# Patient Record
Sex: FEMALE | Race: WHITE | Employment: OTHER | URBAN - METROPOLITAN AREA
[De-identification: names, ages, dates, MRNs, and addresses within clinical notes are randomized per-mention and may not be internally consistent; named-entity substitution may affect disease eponyms.]

---

## 2017-12-21 ENCOUNTER — HOSPITAL ENCOUNTER (OUTPATIENT)
Dept: WOUND CARE | Age: 77
Discharge: HOME | End: 2017-12-21
Attending: PODIATRIST | Admitting: PODIATRIST

## 2017-12-21 VITALS
WEIGHT: 207.23 LBS | BODY MASS INDEX: 40.69 KG/M2 | RESPIRATION RATE: 18 BRPM | DIASTOLIC BLOOD PRESSURE: 79 MMHG | TEMPERATURE: 98.8 F | HEIGHT: 60 IN | SYSTOLIC BLOOD PRESSURE: 140 MMHG | HEART RATE: 80 BPM

## 2017-12-21 DIAGNOSIS — L97.422 ULCER OF LEFT HEEL, WITH FAT LAYER EXPOSED (HCC): ICD-10-CM

## 2017-12-21 RX ORDER — SIMVASTATIN 20 MG
20 TABLET ORAL NIGHTLY
COMMUNITY
Start: 2017-12-06

## 2017-12-21 RX ORDER — CETIRIZINE HYDROCHLORIDE 10 MG/1
TABLET ORAL
COMMUNITY
Start: 2015-11-09

## 2017-12-21 RX ORDER — LIDOCAINE HYDROCHLORIDE 20 MG/ML
3 INJECTION, SOLUTION EPIDURAL; INFILTRATION; INTRACAUDAL; PERINEURAL ONCE
Status: DISCONTINUED | OUTPATIENT
Start: 2017-12-21 | End: 2017-12-22 | Stop reason: HOSPADM

## 2017-12-21 RX ORDER — FENOFIBRATE 160 MG/1
TABLET ORAL
COMMUNITY
Start: 2017-11-05

## 2017-12-21 RX ORDER — ACETAMINOPHEN 500 MG
500 TABLET ORAL EVERY 6 HOURS PRN
COMMUNITY

## 2017-12-21 RX ORDER — LEVOTHYROXINE SODIUM 0.07 MG/1
75 TABLET ORAL DAILY
COMMUNITY
Start: 2017-06-07

## 2017-12-21 RX ORDER — FERROUS SULFATE 325(65) MG
325 TABLET ORAL
COMMUNITY

## 2017-12-21 RX ORDER — FUROSEMIDE 40 MG/1
TABLET ORAL
COMMUNITY
Start: 2017-11-05

## 2017-12-21 RX ORDER — LIDOCAINE HYDROCHLORIDE 40 MG/ML
SOLUTION TOPICAL PRN
Status: DISCONTINUED | OUTPATIENT
Start: 2017-12-21 | End: 2017-12-22 | Stop reason: HOSPADM

## 2017-12-21 RX ORDER — LOSARTAN POTASSIUM 100 MG/1
TABLET ORAL
COMMUNITY
Start: 2017-12-06

## 2017-12-21 RX ORDER — INDAPAMIDE 1.25 MG/1
TABLET, FILM COATED ORAL
COMMUNITY
Start: 2017-12-06

## 2017-12-21 ASSESSMENT — PAIN DESCRIPTION - ONSET: ONSET: ON-GOING

## 2017-12-21 ASSESSMENT — PAIN DESCRIPTION - PROGRESSION: CLINICAL_PROGRESSION: NOT CHANGED

## 2017-12-21 ASSESSMENT — PAIN DESCRIPTION - DESCRIPTORS: DESCRIPTORS: ACHING

## 2017-12-21 ASSESSMENT — PAIN DESCRIPTION - PAIN TYPE: TYPE: ACUTE PAIN

## 2017-12-21 ASSESSMENT — PAIN DESCRIPTION - LOCATION: LOCATION: FOOT

## 2017-12-21 ASSESSMENT — PAIN SCALES - GENERAL: PAINLEVEL_OUTOF10: 3

## 2017-12-21 ASSESSMENT — PAIN DESCRIPTION - ORIENTATION: ORIENTATION: LEFT;RIGHT

## 2017-12-21 ASSESSMENT — PAIN DESCRIPTION - FREQUENCY: FREQUENCY: INTERMITTENT

## 2017-12-21 NOTE — PROGRESS NOTES
Y[facility]h [address]   Progress Note and Procedure Note      Jose Payne  MEDICAL RECORD NUMBER:  [id number]  AGE: 76 y.o. GENDER: female  : 1940  EPISODE DATE:  2017    Subjective:     Chief Complaint   Patient presents with    Wound Check     Initial visit wounds on left and right dorsal lateral feet left noted 3 years ago and the right started 1 year ago. Patient has been treating at home with a \"salve\" and bandaids. HISTORY of PRESENT ILLNESS HPI     Jsoe Payne is a 76 y.o. female who presents today for wound/ulcer evaluation. History of Wound Context: patient present with a left dorsal foot wound that she relates that she has had on and off for the last 3 years. It started as an area with a scab that she picked off and it has continued eversince. Wound/Ulcer Pain Timing/Severity: none  Quality of pain: N/A  Severity:  0 / 10   Modifying Factors: None  Associated Signs/Symptoms: none    Ulcer Identification:  Ulcer Type: undetermined  Contributing Factors: diabetes    Wound: N/A        PAST MEDICAL HISTORY        Diagnosis Date    Arthritis     Cancer ([facility] [address]     uterine    Diabetes mellitus ([facility] [address]     Pre DM    GI bleed     Hyperlipidemia     Thyroid disease        PAST SURGICAL HISTORY    Past Surgical History:   Procedure Laterality Date    BACK SURGERY      CATARACT REMOVAL         FAMILY HISTORY    History reviewed. No pertinent family history. SOCIAL HISTORY    Social History   Substance Use Topics    Smoking status: Never Smoker    Smokeless tobacco: Never Used    Alcohol use No       ALLERGIES    Allergies   Allergen Reactions    Sulfa Antibiotics Swelling       MEDICATIONS    No current outpatient prescriptions on file prior to encounter. No current facility-administered medications on file prior to encounter. REVIEW OF SYSTEMS    Pertinent items are noted in HPI.    Review of Systems: A 12 point review of symptoms is unremarkable with the exception of the chief complaint. Patient specifically denies nausea, fever, vomiting, chills, shortness of breath, chest pain, abdominal pain, constipation or difficulty urinating. Objective:      BP (!) 140/79   Pulse 80   Temp 98.8 °F (37.1 °C) (Oral)   Resp 18   Ht 5' (1.524 m)   Wt 207 lb 3.7 oz (94 kg)   BMI 40.47 kg/m²     Wt Readings from Last 3 Encounters:   12/21/17 207 lb 3.7 oz (94 kg)       PHYSICAL EXAM    DP/PT pulses palpable bilaterally. CFT brisk to all digits. Digits are pink and warm to the touch. Hair growth is normal in appearance. No edema noted. No calf pain with palpation noted. Epicritic sensation is grossly diminished distal to the midfoot bilaterally. Negative clonus and babinski reflex is down going. Wound noted over the dorsal aspect of the left foot. Wound has fibrotic and nonviable tissue that extends down through and includes the subcutaneous tissue. After debridement the wound has a granular base. There is no surrounding erythema, edema, warmth or malodor noted. The wound does not probe or track to bone. Assessment:      Procedure  Biopsy Note    Performed by: Damion Parham DPM    Consent obtained?  Yes    Time out taken:Yes    Pain Control: Anesthetic: 4% Lidocaine Liquid Topical (2.5mL's) 3ml of intradermal lidocaine    Location of Biopsy:  Wound/Ulcer Number(s)  Wound/Ulcer # 1    Wound 12/21/17 Foot Left;Dorsal;Lateral wound #1, approx: 3 years (Active)   Wound Image   12/21/2017  9:19 AM   Wound Type Wound 12/21/2017  9:19 AM   Wound Other 12/21/2017  9:19 AM   Wound Length (cm) 0.5 cm 12/21/2017  9:57 AM   Wound Width (cm) 0.5 cm 12/21/2017  9:57 AM   Wound Depth (cm)  0.2 12/21/2017  9:57 AM   Calculated Wound Size (cm^2) (l*w) 0.25 cm^2 12/21/2017  9:57 AM   Change in Wound Size % (l*w) -56.25 12/21/2017  9:57 AM   Wound Assessment Granulation tissue;Slough 12/21/2017  9:19 AM   Drainage Amount None 12/21/2017  9:19 AM   Odor None 12/21/2017  9:19 AM Margins Undefined edges 12/21/2017  9:19 AM   Tiny-wound Assessment Dry;Pink 12/21/2017  9:19 AM   Non-staged Wound Description Full thickness 12/21/2017  9:19 AM   Celeste%Wound Bed 10 12/21/2017  9:19 AM   Yellow%Wound Bed 90 12/21/2017  9:19 AM   Op First Treatment Date 12/21/17 12/21/2017  9:19 AM   Number of days: 0       Wound 12/21/17 Foot Right;Dorsal;Lateral wound #2; approx: 1 year (Active)   Wound Image   12/21/2017  9:19 AM   Dressing Status Old drainage 12/21/2017  9:19 AM   Wound Length (cm) 0.6 cm 12/21/2017  9:57 AM   Wound Width (cm) 0.5 cm 12/21/2017  9:57 AM   Wound Depth (cm)  0.2 12/21/2017  9:57 AM   Calculated Wound Size (cm^2) (l*w) 0.3 cm^2 12/21/2017  9:57 AM   Change in Wound Size % (l*w) -50 12/21/2017  9:57 AM   Wound Assessment Slough;Granulation tissue 12/21/2017  9:19 AM   Drainage Amount None 12/21/2017  9:19 AM   Odor None 12/21/2017  9:19 AM   Tiny-wound Assessment Pink;Dry 12/21/2017  9:19 AM   Non-staged Wound Description Full thickness 12/21/2017  9:19 AM   Celeste%Wound Bed 90 12/21/2017  9:19 AM   Yellow%Wound Bed 10 12/21/2017  9:19 AM   Op First Treatment Date 12/21/17 12/21/2017  9:19 AM   Number of days: 0        Biopsy performed with: 3 mm skin punch    Instruments:scissors and forceps     Specimen sent to Pathology:Yes    Estimated Blood Loss: with a small amount of bleeding    Hemostasis Achieved:by pressure    Procedural Pain:0  / 10     Post Procedural Pain:0 / 10     Response to treatment:Well tolerated by patient. Plan:   E/M x 30 minutes of a new problem of left dorsal foot wound. Due to the long standing nature of the wound a cutaneous punch biopsy was performed to evaluate for a possible malignancy as patient has a history of skin cancer. Punch biopsy was performed and tissue sent to dermatopathology. Treatment Note please see attached Discharge Instructionsv    Written patient dismissal instructions given to patient and signed by patient or POA. RTC 1 week    Discharge Instructions       Wound Clinic Physician Orders and Discharge Instructions  [facility]  [address]   [address] [place]  Telephone: [phone]    NAME:  Bridgett Payne  YOB: 1940  MEDICAL RECORD NUMBER:  [id number]  DATE:  12/21/2017    Wound Cleansing:   Do not scrub or use excessive force. Cleanse wound prior to applying a clean dressing with:  [] Normal Saline [] Keep Wound Dry in Shower    [] Wound Cleanser   [] Cleanse wound with Mild Soap & Water  [] May Shower at Discharge   [] Other:       Topical Treatments:  Do not apply lotions, creams, or ointments to wound bed unless directed. [] Apply moisturizing lotion to skin surrounding the wound prior to dressing change.  [] Apply antifungal ointment to skin surrounding the wound prior to dressing change.  [] Apply thin film of moisture barrier ointment to skin immediately around wound. [] Other:      Dressings:           Wound Location: Left foot wound  [x] Apply Primary Dressing:       [x] Polysporin  [] Other:    [x] Cover and Secure with:     [x] Gauze [x] Cover Roll Tape [] ABD     [] Other:    Avoid contact of tape with skin.   [x] Change dressing:    [x] Other:Leave Dressing on for 3 days; then may cover with Band- Aid until scabbed      Dressings:           Wound Location: Right foot    [x] Apply Primary Dressing:       [] MediHoney Gel [] Alginate with Silver [] Alginate   [] Collagen [] Collagen with Silver   [] Santyl with Moisten saline gauze     [] Hydrocolloid   [] MediHoney Alginate [] Foam with Silver   [] Foam   [] Hydrofera Blue    [] Mepilex Border    [] Moisten with Saline [] Hydrogel [] Mepitel     [] Bactroban/Mupirocin [x] Polysporin  [] Other:    [] Pack wound loosely with  [] Iodoform   [] Plain Packing  [] Other   [] Cover and Secure with:     [] Gauze [] Laurie [] Kerlix   [] Ace Wrap [] Cover Roll Tape [] ABD     [x] Other: wound(s) or have questions about your wound care, please contact the [facility] at [address] 8:30 am - 4:30 pm and Friday 8:30 am - 1:00 pm.  If you need help with your wound outside these hours and cannot wait until we are again available, contact your PCP or go to the hospital emergency room. Nurse Signature:_______________________    Date: ___________ Time:  ____________      Discharge Nurse Signature      PLEASE NOTE: IF YOU ARE UNABLE TO OBTAIN WOUND SUPPLIES, CONTINUE TO USE THE SUPPLIES YOU HAVE AVAILABLE UNTIL YOU ARE ABLE TO [facility]. IT IS MOST IMPORTANT TO KEEP THE WOUND COVERED AT ALL TIMES. Physician Signature:_______________________    Date: ___________ Time:  ____________       [x] Dr Lokesh Florian    [] Dr Abhijeet Reynolds   [] Malathi Comer CNP     [] Dr David Padilla  [] Dr Cameron Griffith   [] Dr Kenney Bartholomew  [] Dr Donnie Caldwell     [] Dr Yesika Ashley   [] Stanley Stubbs NP    [] Dr. Nando Gonzalez      The information contained in the After Visit Summary has been reviewed with me, the patient and/or responsible adult, by my health care provider(s). I had the opportunity to ask questions regarding this information. I have elected to receive;       Patient Signature:_______________________    Date: ___________ Time:  ____________    [] Patient unable to sign Discharge Instructions given to ECF/Transportation/POA      [x]  After Visit Summary  []  Comprehensive Discharge Instruction                Electronically signed by Lokesh Florian DPM on 12/21/2017 at 2:37 PM

## 2017-12-28 ENCOUNTER — HOSPITAL ENCOUNTER (OUTPATIENT)
Dept: WOUND CARE | Age: 77
Discharge: HOME | End: 2017-12-28
Attending: PODIATRIST | Admitting: PODIATRIST

## 2017-12-28 VITALS
TEMPERATURE: 98 F | DIASTOLIC BLOOD PRESSURE: 70 MMHG | SYSTOLIC BLOOD PRESSURE: 144 MMHG | HEART RATE: 68 BPM | RESPIRATION RATE: 18 BRPM

## 2017-12-28 DIAGNOSIS — D04.71: ICD-10-CM

## 2017-12-28 ASSESSMENT — PAIN SCALES - GENERAL: PAINLEVEL_OUTOF10: 0

## 2017-12-28 NOTE — PROGRESS NOTES
Yisel Saleh [address]   Progress Note and Procedure Note      Enedina Payne  MEDICAL RECORD NUMBER:  [id number]  AGE: 76 y.o. GENDER: female  : 1940  EPISODE DATE:  2017    Subjective:     Chief Complaint   Patient presents with    Wound Check     Follow-up visit for wounds to the right and left feet. HISTORY of PRESENT ILLNESS HPI     Enedina Payne is a 76 y.o. female who presents today for wound/ulcer evaluation. History of Wound Context: patient present with a left dorsal foot wound that she relates that she has had on and off for the last 3 years. It started as an area with a scab that she picked off and it has continued eversince. Wound/Ulcer Pain Timing/Severity: none  Quality of pain: N/A  Severity:  0 / 10   Modifying Factors: None  Associated Signs/Symptoms: none    Ulcer Identification:  Ulcer Type: undetermined  Contributing Factors: diabetes    Wound: N/A        PAST MEDICAL HISTORY        Diagnosis Date    Arthritis     Cancer ([facility] [address]     uterine    Diabetes mellitus ([facility] [address]     Pre DM    GI bleed     Hyperlipidemia     Thyroid disease        PAST SURGICAL HISTORY    Past Surgical History:   Procedure Laterality Date    BACK SURGERY      CATARACT REMOVAL         FAMILY HISTORY    History reviewed. No pertinent family history.     SOCIAL HISTORY    Social History   Substance Use Topics    Smoking status: Never Smoker    Smokeless tobacco: Never Used    Alcohol use No       ALLERGIES    Allergies   Allergen Reactions    Sulfa Antibiotics Swelling       MEDICATIONS    Current Outpatient Prescriptions on File Prior to Encounter   Medication Sig Dispense Refill    acetaminophen (TYLENOL) 500 MG tablet Take 500 mg by mouth every 6 hours as needed for Pain      Calcium Carb-Cholecalciferol 600-100 MG-UNIT CAPS Take by mouth      cetirizine (ZYRTEC) 10 MG tablet TAKE ONE TABLET BY MOUTH EVERY DAY      fenofibrate 160 MG tablet TAKE 1 TABLET EVERY MORNING      ferrous sulfate 325 (65 Fe) MG tablet Take 325 mg by mouth      furosemide (LASIX) 40 MG tablet TAKE 1 TABLET EVERY MORNING      indapamide (LOZOL) 1.25 MG tablet TAKE 1 TABLET DAILY      losartan (COZAAR) 100 MG tablet TAKE 1 TABLET TWICE A DAY      metFORMIN (GLUCOPHAGE) 500 MG tablet One pill twice a day      simvastatin (ZOCOR) 20 MG tablet TAKE 1 TABLET AT BEDTIME      levothyroxine (SYNTHROID) 75 MCG tablet Take 75 mcg by mouth       No current facility-administered medications on file prior to encounter. REVIEW OF SYSTEMS    Pertinent items are noted in HPI. Review of Systems: A 12 point review of symptoms is unremarkable with the exception of the chief complaint. Patient specifically denies nausea, fever, vomiting, chills, shortness of breath, chest pain, abdominal pain, constipation or difficulty urinating. Objective:      BP (!) 144/70   Pulse 68   Temp 98 °F (36.7 °C) (Oral)   Resp 18     Wt Readings from Last 3 Encounters:   12/21/17 207 lb 3.7 oz (94 kg)       PHYSICAL EXAM    DP/PT pulses palpable bilaterally. CFT brisk to all digits. Digits are pink and warm to the touch. Hair growth is normal in appearance. No edema noted. No calf pain with palpation noted. Epicritic sensation is grossly diminished distal to the midfoot bilaterally. Negative clonus and babinski reflex is down going. Wound noted over the dorsal aspect of the left foot. Wound has fibrotic and nonviable tissue that extends down through and includes the subcutaneous tissue. After debridement the wound has a granular base. There is no surrounding erythema, edema, warmth or malodor noted. The wound does not probe or track to bone. 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   Due to the long standing nature of the wound a cutaneous punch biopsy was performed to evaluate for a possible malignancy as patient has a history of skin cancer and was positive. Patient will be scheduled for surgical excision of this lesion and a similar lesion on the right foot under local anesthesia. A fresh frozen section will be sent to insure that clean margins are obtained prior to wound closure. All risks vs benefits of the planned procedure were discussed with the patient in detail. Punch biopsy results was reviewed with the patient    Treatment Note please see attached Discharge Instructions    Written patient dismissal instructions given to patient and signed by patient or POA. RTC for post operative visit. Discharge Instructions       Wound Clinic Physician Orders and Discharge Instructions  [facility]  [address] [place] [address]   [address] [facility] [place] [address]  Telephone: [phone]     NAME:  Dee Payne  YOB: 1940  MEDICAL RECORD NUMBER:  [id number]  DATE:  12/28/2017     Wound Cleansing:   Do not scrub or use excessive force. Cleanse wound prior to applying a clean dressing with:  [] Normal Saline            [] Keep Wound Dry in Shower    [] Wound Cleanser   [] Cleanse wound with Mild Soap & Water  [] May Shower at Discharge   [] Other:        Topical Treatments:  Do not apply lotions, creams, or ointments to wound bed unless directed. [] Apply moisturizing lotion to skin surrounding the wound prior to dressing change.  [] Apply antifungal ointment to skin surrounding the wound prior to dressing change.  [] Apply thin film of moisture barrier ointment to skin immediately around wound.   [] Other:                 Dressings:                  Wound Location: Left foot wound  [x] Apply Primary Dressing:                                              [x] Polysporin                  [] Other:    [x] Cover and Secure with:                       [x] Gauze [x] Cover Roll Tape         [] ABD After Visit Summary  []  Comprehensive Discharge Instruction           Electronically signed by Joe Martinez DPM on 12/28/2017 at 1:16 PM

## 2018-01-18 ENCOUNTER — HOSPITAL ENCOUNTER (OUTPATIENT)
Dept: SURGERY | Age: 78
Discharge: HOME | End: 2018-01-18
Attending: PODIATRIST | Admitting: PODIATRIST

## 2018-01-18 VITALS
OXYGEN SATURATION: 97 % | SYSTOLIC BLOOD PRESSURE: 146 MMHG | RESPIRATION RATE: 16 BRPM | TEMPERATURE: 97.6 F | DIASTOLIC BLOOD PRESSURE: 62 MMHG | HEART RATE: 76 BPM

## 2018-01-18 DIAGNOSIS — D04.72: ICD-10-CM

## 2018-01-18 RX ORDER — CEPHALEXIN 500 MG/1
500 CAPSULE ORAL 4 TIMES DAILY
Qty: 28 CAPSULE | Refills: 0 | Status: SHIPPED | OUTPATIENT
Start: 2018-01-18 | End: 2018-01-25

## 2018-01-18 RX ORDER — HYDROCODONE BITARTRATE AND ACETAMINOPHEN 5; 325 MG/1; MG/1
1 TABLET ORAL EVERY 6 HOURS PRN
Qty: 20 TABLET | Refills: 0 | Status: SHIPPED | OUTPATIENT
Start: 2018-01-18 | End: 2018-01-23

## 2018-01-18 ASSESSMENT — PAIN SCALES - GENERAL
PAINLEVEL_OUTOF10: 0
PAINLEVEL_OUTOF10: 0

## 2018-01-18 NOTE — PROGRESS NOTES
Alert and oriented. No c/o. Vss. Dressings and boots are clean dry intact. Toes warm, move well, renee well. Family at bedside. Understands discharge instructions. Tolerated sitting up and po fluids well.

## 2018-01-18 NOTE — BRIEF OP NOTE
Brief Postoperative Note    Melani Payne  YOB: 1940  MRN: [id number]  DOS: 1/18/2018    Surgeon: Dr. Bienvenido Borges DPM    Assistants: Za Schuster, PGY-3; Zheng Tobar PGY-1    Pre-operative Diagnosis:   1. Squamous cell carcinoma, left foot  2. Neoplasm of unknown origin, right foot    Post-operative Diagnosis:   1. Squamous cell carcinoma in situ, left and right    Procedure:   1. Excision of squamous cell carcinoma, left foot  2. Advancement closure, left foot  3. Excision of squamous cell carcinoma, right foot  4. Advancement closure, right foot    Anesthesia: Local    Hemostasis: anatomic dissection, Bovie electrocautery     Estimated Blood Loss: less than 10 cc    Materials: 3-0 nylon    Injectables: Pre: refer to op report ; Post: none    Complications: None    Specimens: Was Obtained:   1. Squamous cell carcinoma sent to pathology, left foot  2. Neoplasm on unknown origin sent to pathology, right foot    Findings: Bilateral specimens were sent for fresh frozen sections intra-operatively; lesions confirmed to be SCC in situ per pathologist; pathologist confirmed complete excision of lesions to normal anatomical margins    The patient tolerated the procedure and anesthesia well and was transported from the operating room to the PACU with vital signs stable and vascular status intact to all aspects of the patient's bilateral lower extremity and digital capillary refill time immediate to the digits of the bilateral  foot. Following a period of post-operative monitoring, the patient will be discharged home with written and oral wound care and follow-up instructions per Dr. Pretty Gomez. The patient is to follow-up with Dr. Pretty Gomez in her private office within 3-5 days. The patient is to keep dressing clean, dry and intact at all times. The patient is to call with if any complications occur.     Dictated for Dr. Bienvenido Borges DPM.    Za Schuster DPM, PGY-3  Pager: [phone]

## 2018-01-19 NOTE — OP NOTE
the bilateral feet utilizing Marcaine with epinephrine. Once local anesthesia had been achieved, the bilateral feet were scrubbed,  prepped, and draped in the usual sterile manner. A time-out was performed. At this time, attention was directed to the patient's right foot. After  the local anesthesia was tested, the lesion was excised in its entirety  with a 3-mm margin creating an approximately 1-cm soft tissue deficit. It  was tagged with a suture placed at 12 o'clock and sent to Pathology for  fresh frozen. The pathology was called back with confirmation that the  lesion, both the diagnosis and clean margins were resected. Once this  confirmation was received, the lesion was then closed utilizing an  advancement flap wound closure of less than 10 sq cm. At this time, a 3:1  ellipse was taken out of the wound and utilizing extensive soft tissue  undermining and rearranging techniques, the subcutaneous flaps were raised  both medially and laterally to bring the wound edges together. At this  time, the skin edges came together with no tension and were closed  utilizing 4-0 Vicryl and 3-0 nylon suture. Upon completion of this,  attention was directed to the right foot. Procedure Title:  Excision of neoplasm with advancement flap wound closure,  right foot. At this time, the aforementioned lesion was circumscribed and excised in  its entirety of approximately 1 cm in diameter. Upon completion of this,  it was tagged at 12 o'clock and sent to Pathology. After Pathology  confirmed both the diagnosis and the clean margins had been excised, two  semi-elliptical skin wedges approximately 3 cm in diameter were created and  the lesion was attempted to close primarily. This required advancement  flap closure with extensive soft tissue rearrangements and raising of  subcutaneous flaps both dorsally and laterally.   This allowed the skin  edges to come together with no tension utilizing 4-0 Vicryl and 3-0

## 2018-01-25 ENCOUNTER — HOSPITAL ENCOUNTER (OUTPATIENT)
Dept: WOUND CARE | Age: 78
Discharge: HOME | End: 2018-01-25
Attending: PODIATRIST | Admitting: PODIATRIST

## 2018-01-25 VITALS
DIASTOLIC BLOOD PRESSURE: 83 MMHG | TEMPERATURE: 97.5 F | RESPIRATION RATE: 16 BRPM | SYSTOLIC BLOOD PRESSURE: 147 MMHG | HEART RATE: 72 BPM

## 2018-01-29 NOTE — PROGRESS NOTES
Yisel [address]   Progress Note and Procedure Note      Rosa Payne  MEDICAL RECORD NUMBER:  [id number]  AGE: 76 y.o. GENDER: female  : 1940  EPISODE DATE:  2018    Subjective:     Chief Complaint   Patient presents with    Wound Check     follow up wounds left and right feet         HISTORY of PRESENT ILLNESS HPI     Rosa Payne is a 76 y.o. female who presents today for wound/ulcer evaluation. History of Wound Context: patient present with a left dorsal foot wound that she relates that she has had on and off for the last 3 years. It started as an area with a scab that she picked off and it has continued eversince. Patient presents today for first post-op visit. She relates that she thought that the ACE bandages were too tight and wishes not have them reapplied. Wound/Ulcer Pain Timing/Severity: none  Quality of pain: N/A  Severity:  0 / 10   Modifying Factors: None  Associated Signs/Symptoms: none    Ulcer Identification:  Ulcer Type: undetermined  Contributing Factors: diabetes    Wound: N/A        PAST MEDICAL HISTORY        Diagnosis Date    Arthritis     Cancer ([facility]     uterine    Diabetes mellitus ([facility]     Pre DM    GI bleed     Hyperlipidemia     Thyroid disease        PAST SURGICAL HISTORY    Past Surgical History:   Procedure Laterality Date    BACK SURGERY      CATARACT REMOVAL         FAMILY HISTORY    History reviewed. No pertinent family history.     SOCIAL HISTORY    Social History   Substance Use Topics    Smoking status: Never Smoker    Smokeless tobacco: Never Used    Alcohol use No       ALLERGIES    Allergies   Allergen Reactions    Sulfa Antibiotics Swelling       MEDICATIONS    Current Outpatient Prescriptions on File Prior to Encounter   Medication Sig Dispense Refill    acetaminophen (TYLENOL) 500 MG tablet Take 500 mg by mouth every 6 hours as needed for Pain      Calcium Carb-Cholecalciferol 600-100 MG-UNIT CAPS Take by mouth      cetirizine (ZYRTEC) 10 MG tablet TAKE ONE TABLET BY MOUTH EVERY DAY      fenofibrate 160 MG tablet TAKE 1 TABLET EVERY MORNING      ferrous sulfate 325 (65 Fe) MG tablet Take 325 mg by mouth      furosemide (LASIX) 40 MG tablet TAKE 1 TABLET EVERY MORNING      indapamide (LOZOL) 1.25 MG tablet TAKE 1 TABLET DAILY      losartan (COZAAR) 100 MG tablet TAKE 1 TABLET TWICE A DAY      metFORMIN (GLUCOPHAGE) 500 MG tablet One pill twice a day      simvastatin (ZOCOR) 20 MG tablet TAKE 1 TABLET AT BEDTIME      levothyroxine (SYNTHROID) 75 MCG tablet Take 75 mcg by mouth       No current facility-administered medications on file prior to encounter. REVIEW OF SYSTEMS    Pertinent items are noted in HPI. Review of Systems: A 12 point review of symptoms is unremarkable with the exception of the chief complaint. Patient specifically denies nausea, fever, vomiting, chills, shortness of breath, chest pain, abdominal pain, constipation or difficulty urinating. Objective:      BP (!) 147/83   Pulse 72   Temp 97.5 °F (36.4 °C) (Oral)   Resp 16     Wt Readings from Last 3 Encounters:   12/21/17 207 lb 3.7 oz (94 kg)       PHYSICAL EXAM    DP/PT pulses palpable bilaterally. CFT brisk to all digits. Digits are pink and warm to the touch. Hair growth is normal in appearance. No edema noted. No calf pain with palpation noted. Epicritic sensation is grossly diminished distal to the midfoot bilaterally. Negative clonus and babinski reflex is down going. Incisions on the bilateral feet are will approximated and coapted. No signs of infection. Biopsy reviewed - squamous cell carcinoma in situ, right left      Assessment:    Squamous cell carcinoma in situ left, right foot    Plan:   E/M x 30 minutes. No signs of infection. Dressing reapplied. Keep C/D/I. Treatment Note please see attached Discharge Instructions    Written patient dismissal instructions given to patient and signed by patient or POA.    Nurse Signature:_______________________     Date: ___________ Time:  ____________        Discharge Nurse Signature        PLEASE NOTE: IF YOU ARE UNABLE TO OBTAIN WOUND SUPPLIES, CONTINUE TO USE THE SUPPLIES YOU HAVE AVAILABLE UNTIL YOU ARE ABLE TO REACH US.  IT IS MOST IMPORTANT TO KEEP THE WOUND COVERED AT ALL TIMES.     Physician Signature:_______________________     Date: ___________ Time:  ____________                    [x] Dr Ilya Canales    [] Dr Aleta Edwards   [] Brenda Rosa CNP                 [] Dr Basia Walton  [] Dr Radha Still   [] Dr Lucita Kovacs                [] Dr Lestine Lundborg   [] Edwin Moreno NP    [] Dr. Alvin Lakhani        The information contained in the After Visit Summary has been reviewed with me, the patient and/or responsible adult, by my health care provider(s).  I had the opportunity to ask questions regarding this information.  I have elected to receive;        Patient Signature:_______________________     Date: ___________ Time:  ____________     [] Patient unable to sign Discharge Instructions given to ECF/Transportation/POA        [x]  After Visit Summary  []  Comprehensive Discharge Instruction           Electronically signed by Ilya Canales DPM on 1/29/2018 at 3:53 PM

## 2018-02-01 ENCOUNTER — HOSPITAL ENCOUNTER (OUTPATIENT)
Dept: WOUND CARE | Age: 78
Discharge: HOME | End: 2018-02-01
Attending: PODIATRIST | Admitting: PODIATRIST

## 2018-02-01 VITALS — RESPIRATION RATE: 16 BRPM | TEMPERATURE: 97.7 F

## 2018-02-01 NOTE — PROGRESS NOTES
receive;        Patient Signature:_______________________     Date: ___________ Time:  ____________     [] Patient unable to sign Discharge Instructions given to ECF/Transportation/POA        [x]  After Visit Summary  []  Comprehensive Discharge Instruction        Electronically signed by Jose C Wu DPM on 2/1/2018 at 1:28 PM

## 2018-02-08 ENCOUNTER — HOSPITAL ENCOUNTER (OUTPATIENT)
Dept: WOUND CARE | Age: 78
Discharge: HOME | End: 2018-02-08
Attending: PODIATRIST | Admitting: PODIATRIST

## 2018-02-08 VITALS
SYSTOLIC BLOOD PRESSURE: 131 MMHG | RESPIRATION RATE: 16 BRPM | HEART RATE: 75 BPM | DIASTOLIC BLOOD PRESSURE: 77 MMHG | TEMPERATURE: 97.5 F

## 2019-11-18 ENCOUNTER — APPOINTMENT (OUTPATIENT)
Dept: GENERAL RADIOLOGY | Age: 79
DRG: 871 | End: 2019-11-18
Payer: MEDICARE

## 2019-11-18 ENCOUNTER — HOSPITAL ENCOUNTER (INPATIENT)
Age: 79
LOS: 2 days | Discharge: HOME | DRG: 871 | End: 2019-11-20
Attending: EMERGENCY MEDICINE | Admitting: INTERNAL MEDICINE
Payer: MEDICARE

## 2019-11-18 ENCOUNTER — APPOINTMENT (OUTPATIENT)
Dept: CT IMAGING | Age: 79
DRG: 871 | End: 2019-11-18
Payer: MEDICARE

## 2019-11-18 DIAGNOSIS — R65.20 SEVERE SEPSIS (HCC): Primary | ICD-10-CM

## 2019-11-18 DIAGNOSIS — E87.6 HYPOKALEMIA: ICD-10-CM

## 2019-11-18 DIAGNOSIS — J18.9 PNEUMONIA DUE TO ORGANISM: ICD-10-CM

## 2019-11-18 DIAGNOSIS — A41.9 SEVERE SEPSIS (HCC): Primary | ICD-10-CM

## 2019-11-18 LAB
A/G RATIO: 1.4 (ref 1.1–2.2)
ALBUMIN SERPL-MCNC: 4 G/DL (ref 3.4–5)
ALP BLD-CCNC: 61 U/L (ref 40–129)
ALT SERPL-CCNC: 14 U/L (ref 10–40)
ANION GAP SERPL CALCULATED.3IONS-SCNC: 16 MMOL/L (ref 3–16)
AST SERPL-CCNC: 39 U/L (ref 15–37)
BASOPHILS ABSOLUTE: 0.1 K/UL (ref 0–0.2)
BASOPHILS RELATIVE PERCENT: 0.6 %
BILIRUB SERPL-MCNC: 0.5 MG/DL (ref 0–1)
BILIRUBIN URINE: NEGATIVE
BLOOD, URINE: NEGATIVE
BUN BLDV-MCNC: 25 MG/DL (ref 7–20)
CALCIUM SERPL-MCNC: 10.1 MG/DL (ref 8.3–10.6)
CHLORIDE BLD-SCNC: 105 MMOL/L (ref 99–110)
CLARITY: CLEAR
CO2: 24 MMOL/L (ref 21–32)
COLOR: YELLOW
CREAT SERPL-MCNC: 0.8 MG/DL (ref 0.6–1.2)
EOSINOPHILS ABSOLUTE: 0.1 K/UL (ref 0–0.6)
EOSINOPHILS RELATIVE PERCENT: 0.8 %
GFR AFRICAN AMERICAN: >60
GFR NON-AFRICAN AMERICAN: >60
GLOBULIN: 2.8 G/DL
GLUCOSE BLD-MCNC: 159 MG/DL (ref 70–99)
GLUCOSE URINE: NEGATIVE MG/DL
HCT VFR BLD CALC: 35.1 % (ref 36–48)
HEMOGLOBIN: 11.7 G/DL (ref 12–16)
KETONES, URINE: NEGATIVE MG/DL
LACTIC ACID, SEPSIS: 2.3 MMOL/L (ref 0.4–1.9)
LACTIC ACID, SEPSIS: 2.4 MMOL/L (ref 0.4–1.9)
LEUKOCYTE ESTERASE, URINE: NEGATIVE
LYMPHOCYTES ABSOLUTE: 0.8 K/UL (ref 1–5.1)
LYMPHOCYTES RELATIVE PERCENT: 7.4 %
MCH RBC QN AUTO: 32 PG (ref 26–34)
MCHC RBC AUTO-ENTMCNC: 33.5 G/DL (ref 31–36)
MCV RBC AUTO: 95.7 FL (ref 80–100)
MICROSCOPIC EXAMINATION: NORMAL
MONOCYTES ABSOLUTE: 0.5 K/UL (ref 0–1.3)
MONOCYTES RELATIVE PERCENT: 4.6 %
NEUTROPHILS ABSOLUTE: 9.5 K/UL (ref 1.7–7.7)
NEUTROPHILS RELATIVE PERCENT: 86.6 %
NITRITE, URINE: NEGATIVE
PDW BLD-RTO: 13.6 % (ref 12.4–15.4)
PH UA: 6 (ref 5–8)
PLATELET # BLD: 170 K/UL (ref 135–450)
PMV BLD AUTO: 10.3 FL (ref 5–10.5)
POTASSIUM SERPL-SCNC: 3.2 MMOL/L (ref 3.5–5.1)
PROTEIN UA: NEGATIVE MG/DL
RAPID INFLUENZA  B AGN: NEGATIVE
RAPID INFLUENZA A AGN: NEGATIVE
RBC # BLD: 3.67 M/UL (ref 4–5.2)
SODIUM BLD-SCNC: 145 MMOL/L (ref 136–145)
SPECIFIC GRAVITY UA: 1.01 (ref 1–1.03)
TOTAL PROTEIN: 6.8 G/DL (ref 6.4–8.2)
TROPONIN: <0.01 NG/ML
URINE REFLEX TO CULTURE: NORMAL
URINE TYPE: NORMAL
UROBILINOGEN, URINE: 1 E.U./DL
WBC # BLD: 11 K/UL (ref 4–11)

## 2019-11-18 PROCEDURE — 96367 TX/PROPH/DG ADDL SEQ IV INF: CPT

## 2019-11-18 PROCEDURE — 6360000004 HC RX CONTRAST MEDICATION: Performed by: PHYSICIAN ASSISTANT

## 2019-11-18 PROCEDURE — 96361 HYDRATE IV INFUSION ADD-ON: CPT

## 2019-11-18 PROCEDURE — 6360000002 HC RX W HCPCS: Performed by: PHYSICIAN ASSISTANT

## 2019-11-18 PROCEDURE — 71046 X-RAY EXAM CHEST 2 VIEWS: CPT

## 2019-11-18 PROCEDURE — 80053 COMPREHEN METABOLIC PANEL: CPT

## 2019-11-18 PROCEDURE — 87040 BLOOD CULTURE FOR BACTERIA: CPT

## 2019-11-18 PROCEDURE — 99285 EMERGENCY DEPT VISIT HI MDM: CPT

## 2019-11-18 PROCEDURE — 36415 COLL VENOUS BLD VENIPUNCTURE: CPT

## 2019-11-18 PROCEDURE — 84484 ASSAY OF TROPONIN QUANT: CPT

## 2019-11-18 PROCEDURE — 93005 ELECTROCARDIOGRAM TRACING: CPT | Performed by: PHYSICIAN ASSISTANT

## 2019-11-18 PROCEDURE — 2580000003 HC RX 258: Performed by: PHYSICIAN ASSISTANT

## 2019-11-18 PROCEDURE — 87804 INFLUENZA ASSAY W/OPTIC: CPT

## 2019-11-18 PROCEDURE — 6370000000 HC RX 637 (ALT 250 FOR IP): Performed by: INTERNAL MEDICINE

## 2019-11-18 PROCEDURE — 2580000003 HC RX 258: Performed by: INTERNAL MEDICINE

## 2019-11-18 PROCEDURE — 6370000000 HC RX 637 (ALT 250 FOR IP): Performed by: PHYSICIAN ASSISTANT

## 2019-11-18 PROCEDURE — 81003 URINALYSIS AUTO W/O SCOPE: CPT

## 2019-11-18 PROCEDURE — 74177 CT ABD & PELVIS W/CONTRAST: CPT

## 2019-11-18 PROCEDURE — 83605 ASSAY OF LACTIC ACID: CPT

## 2019-11-18 PROCEDURE — 96365 THER/PROPH/DIAG IV INF INIT: CPT

## 2019-11-18 PROCEDURE — 85025 COMPLETE CBC W/AUTO DIFF WBC: CPT

## 2019-11-18 PROCEDURE — 1200000000 HC SEMI PRIVATE

## 2019-11-18 PROCEDURE — 6360000002 HC RX W HCPCS: Performed by: INTERNAL MEDICINE

## 2019-11-18 RX ORDER — ONDANSETRON 2 MG/ML
4 INJECTION INTRAMUSCULAR; INTRAVENOUS EVERY 6 HOURS PRN
Status: DISCONTINUED | OUTPATIENT
Start: 2019-11-18 | End: 2019-11-20 | Stop reason: HOSPADM

## 2019-11-18 RX ORDER — POTASSIUM CHLORIDE 20 MEQ/1
40 TABLET, EXTENDED RELEASE ORAL ONCE
Status: DISCONTINUED | OUTPATIENT
Start: 2019-11-18 | End: 2019-11-18 | Stop reason: CLARIF

## 2019-11-18 RX ORDER — POTASSIUM CHLORIDE 750 MG/1
40 TABLET, FILM COATED, EXTENDED RELEASE ORAL ONCE
Status: COMPLETED | OUTPATIENT
Start: 2019-11-18 | End: 2019-11-18

## 2019-11-18 RX ORDER — ALBUTEROL SULFATE 2.5 MG/3ML
2.5 SOLUTION RESPIRATORY (INHALATION)
Status: DISCONTINUED | OUTPATIENT
Start: 2019-11-18 | End: 2019-11-20 | Stop reason: HOSPADM

## 2019-11-18 RX ORDER — SODIUM CHLORIDE 0.9 % (FLUSH) 0.9 %
10 SYRINGE (ML) INJECTION PRN
Status: DISCONTINUED | OUTPATIENT
Start: 2019-11-18 | End: 2019-11-20 | Stop reason: HOSPADM

## 2019-11-18 RX ORDER — 0.9 % SODIUM CHLORIDE 0.9 %
30 INTRAVENOUS SOLUTION INTRAVENOUS ONCE
Status: COMPLETED | OUTPATIENT
Start: 2019-11-18 | End: 2019-11-18

## 2019-11-18 RX ORDER — ACETAMINOPHEN 325 MG/1
650 TABLET ORAL EVERY 4 HOURS PRN
Status: DISCONTINUED | OUTPATIENT
Start: 2019-11-18 | End: 2019-11-20 | Stop reason: HOSPADM

## 2019-11-18 RX ORDER — LEVOFLOXACIN 5 MG/ML
750 INJECTION, SOLUTION INTRAVENOUS EVERY 24 HOURS
Status: DISCONTINUED | OUTPATIENT
Start: 2019-11-19 | End: 2019-11-20 | Stop reason: HOSPADM

## 2019-11-18 RX ORDER — ACETAMINOPHEN 500 MG
1000 TABLET ORAL ONCE
Status: COMPLETED | OUTPATIENT
Start: 2019-11-18 | End: 2019-11-18

## 2019-11-18 RX ORDER — VERAPAMIL HYDROCHLORIDE 240 MG/1
240 CAPSULE, EXTENDED RELEASE ORAL NIGHTLY
COMMUNITY

## 2019-11-18 RX ORDER — VERAPAMIL HYDROCHLORIDE 240 MG/1
240 TABLET, FILM COATED, EXTENDED RELEASE ORAL NIGHTLY
Status: DISCONTINUED | OUTPATIENT
Start: 2019-11-18 | End: 2019-11-20 | Stop reason: HOSPADM

## 2019-11-18 RX ORDER — BACLOFEN 10 MG/1
10 TABLET ORAL NIGHTLY
COMMUNITY

## 2019-11-18 RX ORDER — SODIUM CHLORIDE 0.9 % (FLUSH) 0.9 %
10 SYRINGE (ML) INJECTION EVERY 12 HOURS SCHEDULED
Status: DISCONTINUED | OUTPATIENT
Start: 2019-11-18 | End: 2019-11-20 | Stop reason: HOSPADM

## 2019-11-18 RX ORDER — SIMVASTATIN 20 MG
20 TABLET ORAL NIGHTLY
Status: DISCONTINUED | OUTPATIENT
Start: 2019-11-18 | End: 2019-11-20 | Stop reason: HOSPADM

## 2019-11-18 RX ORDER — LOSARTAN POTASSIUM 100 MG/1
100 TABLET ORAL 2 TIMES DAILY
Status: DISCONTINUED | OUTPATIENT
Start: 2019-11-18 | End: 2019-11-20 | Stop reason: HOSPADM

## 2019-11-18 RX ORDER — LEVOTHYROXINE SODIUM 0.07 MG/1
75 TABLET ORAL DAILY
Status: DISCONTINUED | OUTPATIENT
Start: 2019-11-19 | End: 2019-11-20 | Stop reason: HOSPADM

## 2019-11-18 RX ADMIN — LEVOFLOXACIN 750 MG: 5 INJECTION, SOLUTION INTRAVENOUS at 23:33

## 2019-11-18 RX ADMIN — SODIUM CHLORIDE, PRESERVATIVE FREE 10 ML: 5 INJECTION INTRAVENOUS at 23:33

## 2019-11-18 RX ADMIN — LOSARTAN POTASSIUM 100 MG: 100 TABLET ORAL at 23:35

## 2019-11-18 RX ADMIN — SIMVASTATIN 20 MG: 20 TABLET, FILM COATED ORAL at 23:35

## 2019-11-18 RX ADMIN — IOVERSOL 100 ML: 678 INJECTION INTRA-ARTERIAL; INTRAVENOUS at 18:09

## 2019-11-18 RX ADMIN — SODIUM CHLORIDE 2790 ML: 9 INJECTION, SOLUTION INTRAVENOUS at 17:09

## 2019-11-18 RX ADMIN — AZITHROMYCIN MONOHYDRATE 500 MG: 500 INJECTION, POWDER, LYOPHILIZED, FOR SOLUTION INTRAVENOUS at 17:10

## 2019-11-18 RX ADMIN — ACETAMINOPHEN 1000 MG: 500 TABLET ORAL at 17:09

## 2019-11-18 RX ADMIN — VERAPAMIL HYDROCHLORIDE 240 MG: 240 TABLET, FILM COATED, EXTENDED RELEASE ORAL at 23:35

## 2019-11-18 RX ADMIN — POTASSIUM CHLORIDE 40 MEQ: 750 TABLET, EXTENDED RELEASE ORAL at 20:06

## 2019-11-18 RX ADMIN — CEFTRIAXONE 1 G: 1 INJECTION, POWDER, FOR SOLUTION INTRAMUSCULAR; INTRAVENOUS at 17:08

## 2019-11-18 ASSESSMENT — PAIN SCALES - GENERAL
PAINLEVEL_OUTOF10: 0

## 2019-11-18 ASSESSMENT — ENCOUNTER SYMPTOMS
SHORTNESS OF BREATH: 0
VOMITING: 0
NAUSEA: 1
COUGH: 0
ABDOMINAL PAIN: 0
RHINORRHEA: 0
DIARRHEA: 0

## 2019-11-19 LAB
ANION GAP SERPL CALCULATED.3IONS-SCNC: 11 MMOL/L (ref 3–16)
BUN BLDV-MCNC: 17 MG/DL (ref 7–20)
CALCIUM SERPL-MCNC: 8.4 MG/DL (ref 8.3–10.6)
CHLORIDE BLD-SCNC: 112 MMOL/L (ref 99–110)
CO2: 21 MMOL/L (ref 21–32)
CREAT SERPL-MCNC: 0.6 MG/DL (ref 0.6–1.2)
EKG ATRIAL RATE: 91 BPM
EKG DIAGNOSIS: NORMAL
EKG P AXIS: 42 DEGREES
EKG P-R INTERVAL: 160 MS
EKG Q-T INTERVAL: 350 MS
EKG QRS DURATION: 86 MS
EKG QTC CALCULATION (BAZETT): 430 MS
EKG R AXIS: 24 DEGREES
EKG T AXIS: 16 DEGREES
EKG VENTRICULAR RATE: 91 BPM
GFR AFRICAN AMERICAN: >60
GFR NON-AFRICAN AMERICAN: >60
GLUCOSE BLD-MCNC: 96 MG/DL (ref 70–99)
GLUCOSE BLD-MCNC: 99 MG/DL (ref 70–99)
HCT VFR BLD CALC: 30.3 % (ref 36–48)
HEMOGLOBIN: 10.3 G/DL (ref 12–16)
LACTIC ACID: 1.2 MMOL/L (ref 0.4–2)
MAGNESIUM: 1.8 MG/DL (ref 1.8–2.4)
MAGNESIUM: 1.9 MG/DL (ref 1.8–2.4)
MCH RBC QN AUTO: 32.6 PG (ref 26–34)
MCHC RBC AUTO-ENTMCNC: 33.9 G/DL (ref 31–36)
MCV RBC AUTO: 96 FL (ref 80–100)
PDW BLD-RTO: 13.5 % (ref 12.4–15.4)
PERFORMED ON: NORMAL
PLATELET # BLD: 132 K/UL (ref 135–450)
PMV BLD AUTO: 10.3 FL (ref 5–10.5)
POTASSIUM REFLEX MAGNESIUM: 3.4 MMOL/L (ref 3.5–5.1)
PROCALCITONIN: 0.79 NG/ML (ref 0–0.15)
RBC # BLD: 3.16 M/UL (ref 4–5.2)
REPORT: NORMAL
RESPIRATORY PANEL PCR: NORMAL
SODIUM BLD-SCNC: 144 MMOL/L (ref 136–145)
WBC # BLD: 12.2 K/UL (ref 4–11)

## 2019-11-19 PROCEDURE — 85027 COMPLETE CBC AUTOMATED: CPT

## 2019-11-19 PROCEDURE — 1200000000 HC SEMI PRIVATE

## 2019-11-19 PROCEDURE — 83605 ASSAY OF LACTIC ACID: CPT

## 2019-11-19 PROCEDURE — 84145 PROCALCITONIN (PCT): CPT

## 2019-11-19 PROCEDURE — 6370000000 HC RX 637 (ALT 250 FOR IP): Performed by: INTERNAL MEDICINE

## 2019-11-19 PROCEDURE — 2580000003 HC RX 258: Performed by: INTERNAL MEDICINE

## 2019-11-19 PROCEDURE — 93010 ELECTROCARDIOGRAM REPORT: CPT | Performed by: INTERNAL MEDICINE

## 2019-11-19 PROCEDURE — 87449 NOS EACH ORGANISM AG IA: CPT

## 2019-11-19 PROCEDURE — 36415 COLL VENOUS BLD VENIPUNCTURE: CPT

## 2019-11-19 PROCEDURE — 0099U HC RESPIRPTHGN MULT REV TRANS & AMP PRB TECH 20 TRGT: CPT

## 2019-11-19 PROCEDURE — 80048 BASIC METABOLIC PNL TOTAL CA: CPT

## 2019-11-19 PROCEDURE — 83735 ASSAY OF MAGNESIUM: CPT

## 2019-11-19 PROCEDURE — 6370000000 HC RX 637 (ALT 250 FOR IP): Performed by: NURSE PRACTITIONER

## 2019-11-19 PROCEDURE — 94760 N-INVAS EAR/PLS OXIMETRY 1: CPT

## 2019-11-19 PROCEDURE — 2700000000 HC OXYGEN THERAPY PER DAY

## 2019-11-19 PROCEDURE — 6360000002 HC RX W HCPCS: Performed by: INTERNAL MEDICINE

## 2019-11-19 RX ORDER — POTASSIUM CHLORIDE 7.45 MG/ML
10 INJECTION INTRAVENOUS PRN
Status: DISCONTINUED | OUTPATIENT
Start: 2019-11-19 | End: 2019-11-20 | Stop reason: HOSPADM

## 2019-11-19 RX ORDER — POTASSIUM CHLORIDE 20 MEQ/1
40 TABLET, EXTENDED RELEASE ORAL PRN
Status: DISCONTINUED | OUTPATIENT
Start: 2019-11-19 | End: 2019-11-20 | Stop reason: HOSPADM

## 2019-11-19 RX ORDER — MAGNESIUM SULFATE 1 G/100ML
1 INJECTION INTRAVENOUS PRN
Status: DISCONTINUED | OUTPATIENT
Start: 2019-11-19 | End: 2019-11-20 | Stop reason: HOSPADM

## 2019-11-19 RX ORDER — PREDNISONE 20 MG/1
40 TABLET ORAL DAILY
Status: DISCONTINUED | OUTPATIENT
Start: 2019-11-19 | End: 2019-11-20 | Stop reason: HOSPADM

## 2019-11-19 RX ORDER — LANOLIN ALCOHOL/MO/W.PET/CERES
3 CREAM (GRAM) TOPICAL NIGHTLY PRN
Status: DISCONTINUED | OUTPATIENT
Start: 2019-11-19 | End: 2019-11-20 | Stop reason: HOSPADM

## 2019-11-19 RX ADMIN — LOSARTAN POTASSIUM 100 MG: 100 TABLET ORAL at 20:52

## 2019-11-19 RX ADMIN — LEVOTHYROXINE SODIUM 75 MCG: 75 TABLET ORAL at 06:05

## 2019-11-19 RX ADMIN — VERAPAMIL HYDROCHLORIDE 240 MG: 240 TABLET, FILM COATED, EXTENDED RELEASE ORAL at 20:52

## 2019-11-19 RX ADMIN — PREDNISONE 40 MG: 20 TABLET ORAL at 11:46

## 2019-11-19 RX ADMIN — LOSARTAN POTASSIUM 100 MG: 100 TABLET ORAL at 08:01

## 2019-11-19 RX ADMIN — ENOXAPARIN SODIUM 40 MG: 40 INJECTION SUBCUTANEOUS at 08:01

## 2019-11-19 RX ADMIN — SODIUM CHLORIDE, PRESERVATIVE FREE 10 ML: 5 INJECTION INTRAVENOUS at 20:53

## 2019-11-19 RX ADMIN — ACETAMINOPHEN 650 MG: 325 TABLET ORAL at 02:15

## 2019-11-19 RX ADMIN — SODIUM CHLORIDE, PRESERVATIVE FREE 10 ML: 5 INJECTION INTRAVENOUS at 09:20

## 2019-11-19 RX ADMIN — MELATONIN 3 MG: at 22:40

## 2019-11-19 RX ADMIN — LEVOFLOXACIN 750 MG: 5 INJECTION, SOLUTION INTRAVENOUS at 22:42

## 2019-11-19 RX ADMIN — SIMVASTATIN 20 MG: 20 TABLET, FILM COATED ORAL at 20:53

## 2019-11-19 RX ADMIN — POTASSIUM BICARBONATE 40 MEQ: 782 TABLET, EFFERVESCENT ORAL at 11:46

## 2019-11-19 ASSESSMENT — PAIN DESCRIPTION - FREQUENCY: FREQUENCY: CONTINUOUS

## 2019-11-19 ASSESSMENT — PAIN DESCRIPTION - ORIENTATION: ORIENTATION: LOWER

## 2019-11-19 ASSESSMENT — PAIN SCALES - GENERAL
PAINLEVEL_OUTOF10: 4
PAINLEVEL_OUTOF10: 0
PAINLEVEL_OUTOF10: 2

## 2019-11-19 ASSESSMENT — PAIN DESCRIPTION - LOCATION: LOCATION: BACK

## 2019-11-19 ASSESSMENT — PAIN DESCRIPTION - DESCRIPTORS: DESCRIPTORS: ACHING

## 2019-11-19 ASSESSMENT — PAIN DESCRIPTION - PAIN TYPE: TYPE: CHRONIC PAIN

## 2019-11-19 ASSESSMENT — PAIN DESCRIPTION - ONSET: ONSET: ON-GOING

## 2019-11-19 ASSESSMENT — PAIN - FUNCTIONAL ASSESSMENT: PAIN_FUNCTIONAL_ASSESSMENT: ACTIVITIES ARE NOT PREVENTED

## 2019-11-20 VITALS
DIASTOLIC BLOOD PRESSURE: 85 MMHG | WEIGHT: 199.74 LBS | SYSTOLIC BLOOD PRESSURE: 127 MMHG | HEART RATE: 77 BPM | TEMPERATURE: 97.8 F | RESPIRATION RATE: 17 BRPM | OXYGEN SATURATION: 95 % | HEIGHT: 59 IN | BODY MASS INDEX: 40.27 KG/M2

## 2019-11-20 LAB
ALBUMIN SERPL-MCNC: 3.3 G/DL (ref 3.4–5)
ANION GAP SERPL CALCULATED.3IONS-SCNC: 11 MMOL/L (ref 3–16)
BASOPHILS ABSOLUTE: 0 K/UL (ref 0–0.2)
BASOPHILS RELATIVE PERCENT: 0.3 %
BUN BLDV-MCNC: 17 MG/DL (ref 7–20)
CALCIUM SERPL-MCNC: 8.9 MG/DL (ref 8.3–10.6)
CHLORIDE BLD-SCNC: 106 MMOL/L (ref 99–110)
CO2: 22 MMOL/L (ref 21–32)
CREAT SERPL-MCNC: 0.8 MG/DL (ref 0.6–1.2)
EOSINOPHILS ABSOLUTE: 0.1 K/UL (ref 0–0.6)
EOSINOPHILS RELATIVE PERCENT: 1.3 %
GFR AFRICAN AMERICAN: >60
GFR NON-AFRICAN AMERICAN: >60
GLUCOSE BLD-MCNC: 96 MG/DL (ref 70–99)
HCT VFR BLD CALC: 32 % (ref 36–48)
HEMOGLOBIN: 10.8 G/DL (ref 12–16)
LYMPHOCYTES ABSOLUTE: 1.5 K/UL (ref 1–5.1)
LYMPHOCYTES RELATIVE PERCENT: 19.6 %
MCH RBC QN AUTO: 32.4 PG (ref 26–34)
MCHC RBC AUTO-ENTMCNC: 33.6 G/DL (ref 31–36)
MCV RBC AUTO: 96.5 FL (ref 80–100)
MONOCYTES ABSOLUTE: 0.6 K/UL (ref 0–1.3)
MONOCYTES RELATIVE PERCENT: 7.6 %
NEUTROPHILS ABSOLUTE: 5.5 K/UL (ref 1.7–7.7)
NEUTROPHILS RELATIVE PERCENT: 71.2 %
PDW BLD-RTO: 13.8 % (ref 12.4–15.4)
PHOSPHORUS: 1.6 MG/DL (ref 2.5–4.9)
PLATELET # BLD: 135 K/UL (ref 135–450)
PMV BLD AUTO: 10.2 FL (ref 5–10.5)
POTASSIUM SERPL-SCNC: 3.3 MMOL/L (ref 3.5–5.1)
RBC # BLD: 3.32 M/UL (ref 4–5.2)
SODIUM BLD-SCNC: 139 MMOL/L (ref 136–145)
STREP PNEUMONIAE ANTIGEN, URINE: NORMAL
WBC # BLD: 7.7 K/UL (ref 4–11)

## 2019-11-20 PROCEDURE — 97165 OT EVAL LOW COMPLEX 30 MIN: CPT

## 2019-11-20 PROCEDURE — 6370000000 HC RX 637 (ALT 250 FOR IP): Performed by: INTERNAL MEDICINE

## 2019-11-20 PROCEDURE — 97535 SELF CARE MNGMENT TRAINING: CPT

## 2019-11-20 PROCEDURE — 97161 PT EVAL LOW COMPLEX 20 MIN: CPT

## 2019-11-20 PROCEDURE — 2580000003 HC RX 258: Performed by: INTERNAL MEDICINE

## 2019-11-20 PROCEDURE — 6360000002 HC RX W HCPCS: Performed by: INTERNAL MEDICINE

## 2019-11-20 PROCEDURE — 97116 GAIT TRAINING THERAPY: CPT

## 2019-11-20 PROCEDURE — 36415 COLL VENOUS BLD VENIPUNCTURE: CPT

## 2019-11-20 PROCEDURE — 85025 COMPLETE CBC W/AUTO DIFF WBC: CPT

## 2019-11-20 PROCEDURE — 80069 RENAL FUNCTION PANEL: CPT

## 2019-11-20 RX ORDER — LEVOFLOXACIN 500 MG/1
500 TABLET, FILM COATED ORAL DAILY
Qty: 5 TABLET | Refills: 0 | Status: SHIPPED | OUTPATIENT
Start: 2019-11-20 | End: 2019-11-25

## 2019-11-20 RX ORDER — PREDNISONE 20 MG/1
40 TABLET ORAL DAILY
Qty: 8 TABLET | Refills: 0 | Status: SHIPPED | OUTPATIENT
Start: 2019-11-21 | End: 2019-11-25

## 2019-11-20 RX ADMIN — LOSARTAN POTASSIUM 100 MG: 100 TABLET ORAL at 08:58

## 2019-11-20 RX ADMIN — SODIUM CHLORIDE, PRESERVATIVE FREE 10 ML: 5 INJECTION INTRAVENOUS at 08:59

## 2019-11-20 RX ADMIN — PREDNISONE 40 MG: 20 TABLET ORAL at 08:58

## 2019-11-20 RX ADMIN — POTASSIUM CHLORIDE 40 MEQ: 20 TABLET, EXTENDED RELEASE ORAL at 10:45

## 2019-11-20 RX ADMIN — ENOXAPARIN SODIUM 40 MG: 40 INJECTION SUBCUTANEOUS at 08:57

## 2019-11-20 RX ADMIN — LEVOTHYROXINE SODIUM 75 MCG: 75 TABLET ORAL at 06:46

## 2019-11-20 ASSESSMENT — PAIN SCALES - GENERAL
PAINLEVEL_OUTOF10: 0

## 2019-11-22 LAB — L. PNEUMOPHILA SEROGP 1 UR AG: NORMAL

## 2019-11-23 LAB
BLOOD CULTURE, ROUTINE: NORMAL
CULTURE, BLOOD 2: NORMAL

## 2021-09-28 ENCOUNTER — OFFICE VISIT (OUTPATIENT)
Dept: PAIN MANAGEMENT | Age: 81
End: 2021-09-28
Payer: MEDICARE

## 2021-09-28 ENCOUNTER — TELEPHONE (OUTPATIENT)
Dept: PAIN MANAGEMENT | Age: 81
End: 2021-09-28

## 2021-09-28 VITALS
HEART RATE: 78 BPM | DIASTOLIC BLOOD PRESSURE: 70 MMHG | SYSTOLIC BLOOD PRESSURE: 105 MMHG | WEIGHT: 200 LBS | RESPIRATION RATE: 16 BRPM | HEIGHT: 59 IN | BODY MASS INDEX: 40.32 KG/M2

## 2021-09-28 DIAGNOSIS — M48.56XS NONTRAUMATIC COMPRESSION FRACTURE OF L4 VERTEBRA, SEQUELA: ICD-10-CM

## 2021-09-28 DIAGNOSIS — M51.36 DDD (DEGENERATIVE DISC DISEASE), LUMBAR: ICD-10-CM

## 2021-09-28 DIAGNOSIS — E11.42 DIABETIC POLYNEUROPATHY ASSOCIATED WITH TYPE 2 DIABETES MELLITUS (HCC): ICD-10-CM

## 2021-09-28 DIAGNOSIS — G89.4 CHRONIC PAIN SYNDROME: ICD-10-CM

## 2021-09-28 PROCEDURE — 99204 OFFICE O/P NEW MOD 45 MIN: CPT | Performed by: INTERNAL MEDICINE

## 2021-09-28 RX ORDER — GABAPENTIN 300 MG/1
CAPSULE ORAL
COMMUNITY
Start: 2021-08-06 | End: 2021-10-27

## 2021-09-28 RX ORDER — DULOXETIN HYDROCHLORIDE 30 MG/1
30 CAPSULE, DELAYED RELEASE ORAL DAILY
Qty: 30 CAPSULE | Refills: 0 | Status: SHIPPED | OUTPATIENT
Start: 2021-09-28 | End: 2021-12-07

## 2021-09-28 RX ORDER — GABAPENTIN 300 MG/1
CAPSULE ORAL
Qty: 60 CAPSULE | Refills: 0 | Status: SHIPPED | OUTPATIENT
Start: 2021-09-28 | End: 2021-12-07

## 2021-09-28 RX ORDER — LIDOCAINE 50 MG/G
PATCH TOPICAL
COMMUNITY
Start: 2021-09-20 | End: 2022-03-30 | Stop reason: SDUPTHER

## 2021-09-28 RX ORDER — BUPRENORPHINE 5 UG/H
1 PATCH TRANSDERMAL WEEKLY
Qty: 4 PATCH | Refills: 0 | Status: SHIPPED | OUTPATIENT
Start: 2021-09-28 | End: 2021-10-06 | Stop reason: SDUPTHER

## 2021-09-28 RX ORDER — DILTIAZEM HYDROCHLORIDE 120 MG/1
CAPSULE, COATED, EXTENDED RELEASE ORAL
COMMUNITY
Start: 2021-09-03

## 2021-09-28 NOTE — TELEPHONE ENCOUNTER
I need the new patient paper work scanned in please since I don't have a NP OV note. Key: [id number]     If this requires a response please respond to the pool ( [facility] [address]). Thank you please advise patient.

## 2021-09-30 NOTE — TELEPHONE ENCOUNTER
Submitted PA for BUPRENORPHINE PATCH  Via CMM Key: [id number] STATUS: \"pproved; Review Type:Prior Auth; Coverage Start Date:08/31/2021; Coverage End Date:09/30/2022\"      The patient was notified by .

## 2021-10-04 NOTE — TELEPHONE ENCOUNTER
Patient called stating the Rx is making her shake, nervous, ill, cant eat and depressed    Did she do the right thing or what should she do???    Do [facility] want to prescribe something else? Her patch never did arrive.     DULoxetine (CYMBALTA) 30 MG extended release capsule     Pharmacy    [address], [address] [facility] -  [phone]

## 2021-10-06 DIAGNOSIS — M51.36 DDD (DEGENERATIVE DISC DISEASE), LUMBAR: ICD-10-CM

## 2021-10-06 DIAGNOSIS — E11.42 DIABETIC POLYNEUROPATHY ASSOCIATED WITH TYPE 2 DIABETES MELLITUS (HCC): ICD-10-CM

## 2021-10-06 DIAGNOSIS — M48.56XS NONTRAUMATIC COMPRESSION FRACTURE OF L4 VERTEBRA, SEQUELA: ICD-10-CM

## 2021-10-06 DIAGNOSIS — G89.4 CHRONIC PAIN SYNDROME: ICD-10-CM

## 2021-10-06 RX ORDER — BUPRENORPHINE 5 UG/H
1 PATCH TRANSDERMAL WEEKLY
Qty: 4 PATCH | Refills: 0 | Status: SHIPPED | OUTPATIENT
Start: 2021-10-06 | End: 2021-10-27 | Stop reason: SDUPTHER

## 2021-10-06 NOTE — TELEPHONE ENCOUNTER
DAUGHTER FOUND   e [facility] LOCATION FOR THE bUTRAN PATCHES.  HOWEVER NEEDS A NEW SCRIPT FROM [facility] FOR THE PATCHES    CAN GET THE SCRIPT BY 2PM     Ph\" [phone]        PLEASE ADVISE

## 2021-10-06 NOTE — TELEPHONE ENCOUNTER
Called patient to advise her that the medication was sent to the pharmacy. She voiced her understanding.

## 2021-10-06 NOTE — TELEPHONE ENCOUNTER
Called patient to advise her that PER she can discontinue the Cymbalta. I also advised her that the Butrans patch was approved and she can get the medication at her pharmacy. Patient states the her pharmacy told her that they do not have that medication and cant get it in stock. I advised patient to call a few pharmacy around her to see if they have the medication in stock and to let us know and we will send a new Rx to that pharmacy. Patient voiced her understanding.

## 2021-10-18 NOTE — PROGRESS NOTES
Ms. Payne is a 78 y.o. female was seen consultation at the request of Dr. Lissett Ivan for pain management patient states that she her back has been hurting cannot walk states she is \"full of arthritis\" can do another surgery states she has had back surgery about 4 years ago did help minimally states has had epidural straight injection about 5 them with no help and has done physical therapy she is also had vertebroplasty done states the bones are brittle back hurts more than the leg denies any history of diabetes states she has been using walker or wheelchair for ambulation describes the pain as aching stabbing pain no pins-and-needles or burning sleep is been fair denies any headaches denies morning stiffness does complain some fatigue 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  Symptoms started suddenly has a prior episodes which been treated medications muscle relaxers heating pad home exercises different other modalities. Most the pain is low back area region to the calf. Activities such as standing walking lifting bending straining going up or down stairs or doing other ADLs causes her to have increased pain self with medications lying in 5 feet flat in a fetal position pain is constant does wax and wane does wake her up at night denies logical bowel or bladder grades of pain 5-6/10 has been taking gabapentin and she also is on blood thinners states she has uterine cancer has thyroid problems also. 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 Does complain of weakness of the lower extremity numbness and tingling leg and foot no instability gait baseline gait problems no history of falls ongoing pain symptoms have restricted social and recreational life.   This an elderly female in a wheelchair no apparent acute distress alert oriented x3 mood and affect is normal gait she is unable to walk unable unsteady while doing heel walk or toe walk some Von signs positive does have swelling of the lower extremity +1-2 edema  Back and trunk exam shows tenderness paralumbar region SI joint region range of motion with normal limits motor strength 4/5 sensory exam is inconsistent there is possible glove stocking distribution sensory deficit reflex absent knees and ankle patient is unable to lie down on the table femoral stretch could not be assessed Harper's test cause ipsilateral pain. /70   Pulse 78   Resp 16   Ht 4' 11\" (1.499 m)   Wt 200 lb (90.7 kg) Comment: Patient report  Breastfeeding No   BMI 40.40 kg/m²   Skin: Warm and dry. Good turgor. No rashes. No lesions or marks noted  Eyes:  PERRLA, cornea/ conjunctiva normal, no nystagmus  HENT:  Atraumatic, external ears normal, nose normal, oropharynx moist, no pharyngeal exudates. Neck- normal range of motion, no tenderness, supple. No bruit, no JVD, No lymph nodes appreciated. Thyroid is not enlarged  Respiratory: Lungs CTAP, No respiratory distress, normal breath sounds, no rales, no wheezing   Cardiovascular:  Normal S1,S2, Normal rate, irregular heartbeat with skipped beats, no murmurs, no gallops, no rubs   GI:  Soft, nondistended, normal bowel sounds, nontender, no organomegaly, no mass, no rebound, no guarding, obese  :  No costovertebral angle tenderness   Musculoskeletal:  No clubbing, no edema, no tenderness, no deformities. There are no varicosities  Lymphatic:  No lymphadenopathy noted   Neurologic:  Alert & oriented x 3, CN 2-12 normal, normal motor function, normal sensory function, no focal deficits noted   Psychiatric:  Speech and behavior appropriate, judgement and thought content normal.    Patient's old records reviewed in detail records from primary care physician reviewed imaging studies reviewed x-ray of lumbar spine was reviewed which shows postop changes status post vertebroplasty degenerative disc disease spondylosis anterior listhesis L4-5. IMPRESSION    CHRONIC PAIN SYNDROME  DEGENERATIVE [place] DISEASE LUMBAR SPINE  DIABETIC PERIPHERAL NEUROPATHY  COMPRESSION FRACTURE LUMBAR SPINE    Chronic opiate treatment protocol was discussed with the patient. Informed verbal consent was obtained. Treatment guidelines were established. Risks and benefits of the medications including narcotics were discussed with the patient. SOAPP questionnaire and opioid risk tool were assessed. Long-term and short-term goals of pain management were also addressed including pain relief about 30% from baseline, improving mood, sleep, psychosocial, and physical functioning were addressed. Advise discontinue baclofen start Neurontin 300 titrate up to 300 twice daily Cymbalta 30 mg 1 a day and Butrans 5 mcg q. 7 days blood tests are reviewed with which are within normal limits will follow-up as per office protocol will do urinedrug screen with GCMS opiates and follow-up on the results Patient profile on OARRS website was reviewed. All treatment options discussed with patient. Goals of current treatment regimen include improvement in pain, restoration of functioning- with focus on improvement in physical performance, general activity, work or disability,emotional distress, health care utilization and  decreased medication consumption. Will continue to monitor progress towards achieving/maintaining therapeutic goals with special emphasis on  1. Improvement in perceived interfernce  of pain with ADL's. Ability to do home exercises independently. Ability to do household chores indoor and/or outdoor work and social and leisure activities. To increase flexibility/ROM, strength and endurance. Improve psychosocial and physical functioning. 2. Improving sleep to 6-7 hours a night.  Improve mood/ anxiety and depression symptoms such as crying spells, low energy, problems with concentration, motivation. 3. Reduction of reliance on opioid analgesia/more appropriate opioid use. Risks and benefits of the medications and other alternative treatments have been/were  discussed with the patient. Any questions on the  common side effects of these medications were also answered. Informed verbal consent was obtained. The current treatment regimen is needed to decrease the patient's pain  symptoms, improve the quality of life and ability to function and improve the  sleep and mood symptoms. Patient was advised against drinking alcohol with the narcotic pain medicines, advised against driving or handling machinery when  starting or adjusting the dose of medicines, feeling groggy or drowsy, or if having any cognitive issues related to the current medications. Patient is fully aware of the risk of overdose and death, if medicines are misused and not taken as prescribed. If Patient develops new symptoms or if the symptoms worsen,  was told to call the office. Thank you for allowing me to participate in the care of this patient. Marquis Tapia MD    Dragon disclaimer: This note was dictated utilizing voice recognition software. Minor errors in transcription may be present.     CC:  Humaira Man MD

## 2021-10-27 ENCOUNTER — OFFICE VISIT (OUTPATIENT)
Dept: PAIN MANAGEMENT | Age: 81
End: 2021-10-27
Payer: MEDICARE

## 2021-10-27 VITALS
BODY MASS INDEX: 39.39 KG/M2 | SYSTOLIC BLOOD PRESSURE: 133 MMHG | HEART RATE: 123 BPM | DIASTOLIC BLOOD PRESSURE: 89 MMHG | WEIGHT: 195 LBS

## 2021-10-27 DIAGNOSIS — M51.36 DDD (DEGENERATIVE DISC DISEASE), LUMBAR: ICD-10-CM

## 2021-10-27 DIAGNOSIS — E11.42 DIABETIC POLYNEUROPATHY ASSOCIATED WITH TYPE 2 DIABETES MELLITUS (HCC): ICD-10-CM

## 2021-10-27 DIAGNOSIS — M48.56XS NONTRAUMATIC COMPRESSION FRACTURE OF L4 VERTEBRA, SEQUELA: ICD-10-CM

## 2021-10-27 DIAGNOSIS — G89.4 CHRONIC PAIN SYNDROME: ICD-10-CM

## 2021-10-27 PROCEDURE — 99213 OFFICE O/P EST LOW 20 MIN: CPT | Performed by: INTERNAL MEDICINE

## 2021-10-27 RX ORDER — BUPRENORPHINE 5 UG/H
1 PATCH TRANSDERMAL WEEKLY
Qty: 4 PATCH | Refills: 0 | Status: SHIPPED | OUTPATIENT
Start: 2021-10-27 | End: 2021-12-07 | Stop reason: SDUPTHER

## 2021-10-27 RX ORDER — GABAPENTIN 300 MG/1
CAPSULE ORAL
Qty: 60 CAPSULE | Refills: 0 | Status: SHIPPED | OUTPATIENT
Start: 2021-10-27 | End: 2021-12-07 | Stop reason: SDUPTHER

## 2021-10-27 NOTE — PROGRESS NOTES
Claudette Casco A Re  1940  <[id number]>    HISTORY OF PRESENT ILLNESS:  Ms. Elis Borges is a 78 y.o. female returns for a follow up visit for multiple medical problems. Her current presenting problems are   1. Chronic pain syndrome    2. DDD (degenerative disc disease), lumbar    3. Diabetic polyneuropathy associated with type 2 diabetes mellitus ([address]    . As per information/history obtained from the PADT(patient assessment and documentation tool) - She complains of pain in the lower back and knees Left She rates the pain 10/10 and describes it as sharp, aching. Pain is made worse by: walking, standing, bending. Current treatment regimen has helped relieve about 10% of the pain. She has anxiety and Cymbalta caused sucidal thoughts, loss of balance, shaky side effects from the current pain regimen. Patient reports that since the last follow up visit the physical functioning is better, family/social relationships are unchanged, mood is unchanged and sleep patterns are worse, and that the overall functioning is unchanged. Patient denies neurological bowel or bladder. Patient denies misusing/abusing her narcotic pain medications or using any illegal drugs. There are No indicators for possible drug abuse, addiction or diversion problems. Upon obtaining the medical history from Ms. Payne regarding today's office visit for her presenting problems, patient states she has been doing fair, had side effects with the Cymbalta and is off it. She says she is using butrans every 7 days along with Neurontin 300 mg BID. She reports she wants to go back to Baclofen. She denies any side effects with it. ALLERGIES: Patients list of allergies were reviewed     MEDICATIONS: Ms. Payne list of medications were reviewed. Her current medications are   Outpatient Medications Prior to Visit   Medication Sig Dispense Refill    buprenorphine (BUTRANS) 5 MCG/HR PTWK Place 1 patch onto the skin once a week for 28 days.  4 patch 0    lidocaine (LIDODERM) 5 %       dilTIAZem (CARDIZEM CD) 120 MG extended release capsule       DULoxetine (CYMBALTA) 30 MG extended release capsule Take 1 capsule by mouth daily 30 capsule 0    gabapentin (NEURONTIN) 300 MG capsule Take 1 capsule po nightly for a week then increase to 1 capsule po BID 60 capsule 0    gabapentin (NEURONTIN) 300 MG capsule       verapamil (VERELAN) 240 MG extended release capsule Take 240 mg by mouth nightly      baclofen (LIORESAL) 10 MG tablet Take 10 mg by mouth nightly      acetaminophen (TYLENOL) 500 MG tablet Take 500 mg by mouth every 6 hours as needed for Pain      Calcium Carb-Cholecalciferol 600-100 MG-UNIT CAPS Take by mouth      cetirizine (ZYRTEC) 10 MG tablet TAKE ONE TABLET BY MOUTH EVERY DAY      fenofibrate 160 MG tablet TAKE 1 TABLET EVERY MORNING      ferrous sulfate 325 (65 Fe) MG tablet Take 325 mg by mouth      furosemide (LASIX) 40 MG tablet TAKE 1 TABLET EVERY MORNING      indapamide (LOZOL) 1.25 MG tablet TAKE 1 TABLET DAILY      losartan (COZAAR) 100 MG tablet TAKE 1 TABLET TWICE A DAY      metFORMIN (GLUCOPHAGE) 500 MG tablet 500 mg 2 times daily (with meals)       simvastatin (ZOCOR) 20 MG tablet Take 20 mg by mouth nightly       levothyroxine (SYNTHROID) 75 MCG tablet Take 75 mcg by mouth Daily        No facility-administered medications prior to visit. REVIEW OF SYSTEMS:    Respiratory: Negative for apnea, chest tightness and shortness of breath or change in baseline breathing. PHYSICAL EXAM:   Nursing note and vitals reviewed. /89   Pulse 123   Wt 195 lb (88.5 kg)   Breastfeeding No   BMI 39.39 kg/m²   Constitutional: She appears well-developed and well-nourished. No acute distress. Cardiovascular: Normal rate, irregular heart beat, normal heart sounds, and does not have murmur. Pulmonary/Chest: Effort normal. No respiratory distress. She does not have wheezes in the lung fields. She has no rales.      Neurological/Psychiatric:She is acetaminophen (TYLENOL) 500 MG tablet Take 500 mg by mouth every 6 hours as needed for Pain      Calcium Carb-Cholecalciferol 600-100 MG-UNIT CAPS Take by mouth      cetirizine (ZYRTEC) 10 MG tablet TAKE ONE TABLET BY MOUTH EVERY DAY      fenofibrate 160 MG tablet TAKE 1 TABLET EVERY MORNING      ferrous sulfate 325 (65 Fe) MG tablet Take 325 mg by mouth      furosemide (LASIX) 40 MG tablet TAKE 1 TABLET EVERY MORNING      indapamide (LOZOL) 1.25 MG tablet TAKE 1 TABLET DAILY      losartan (COZAAR) 100 MG tablet TAKE 1 TABLET TWICE A DAY      metFORMIN (GLUCOPHAGE) 500 MG tablet 500 mg 2 times daily (with meals)       simvastatin (ZOCOR) 20 MG tablet Take 20 mg by mouth nightly       levothyroxine (SYNTHROID) 75 MCG tablet Take 75 mcg by mouth Daily        No current facility-administered medications for this visit. I will continue her current medication regimen  which is part of the above treatment schedule. It has been helping with Ms. Payne's chronic  medical problems which for this visit include:   Diagnoses of Chronic pain syndrome, DDD (degenerative disc disease), lumbar, and Diabetic polyneuropathy associated with type 2 diabetes mellitus ([facility] [address] were pertinent to this visit. Risks and benefits of the medications and other alternative treatments  including no treatment were discussed with the patient. The common side effects of these medications were also explained to the patient. Informed verbal consent was obtained. Goals of current treatment regimen include improvement in pain, restoration of functioning- with focus on improvement in physical performance, general activity, work or disability,emotional distress, health care utilization and  decreased medication consumption. Will continue to monitor progress towards achieving/maintaining therapeutic goals with special emphasis on  1. Improvement in perceived interfernce  of pain with ADL's. Ability to do home exercises independently.  Ability to do household chores indoor and/or outdoor work and social and leisure activities. Improve psychosocial and physical functioning. - she is showing progression towards this treatment goal with the current regimen. She was advised against drinking alcohol with the narcotic pain medicines, advised against driving or handling machinery while adjusting the dose of medicines or if having cognitive  issues related to the current medications. Risk of overdose and death, if medicines not taken as prescribed, were also discussed. If the patient develops new symptoms or if the symptoms worsen, the patient should call the office. While transcribing every attempt was made to maintain the accuracy of the note in terms of it's contents,there may have been some errors made inadvertently. Thank you for allowing me to participate in the care of this patient.     Juan Francisco Jara MD.    Cc: Clinton Paz MD

## 2021-12-02 NOTE — TELEPHONE ENCOUNTER
Called patient to advise her that her medication would be refilled at her office visit. Patient states that she has been out of medication for 2 weeks. I did ask patient why she was was out. She states that the was given a 6 week appointment and only a 4 week rx of patches. Patients OARRS was checked per OARRS patient had filled her Rx on 11/3/2021, she would have put the last patch on today which her next patch would not be due to change until 12/8/2021. She has appointment on 12/7/2021. Patient seemed confused on when the patch was put on. She states that it was 2 weeks ago. But per OARRS she should have put her last patch on. Advised patient that [address] [place] [address] will be back in the office on Monday and discuss with him then. Patient states that she will just follow up with him at her office visit.

## 2021-12-07 ENCOUNTER — OFFICE VISIT (OUTPATIENT)
Dept: PAIN MANAGEMENT | Age: 81
End: 2021-12-07
Payer: MEDICARE

## 2021-12-07 VITALS
BODY MASS INDEX: 38.3 KG/M2 | HEIGHT: 59 IN | SYSTOLIC BLOOD PRESSURE: 118 MMHG | DIASTOLIC BLOOD PRESSURE: 82 MMHG | WEIGHT: 190 LBS | HEART RATE: 124 BPM | RESPIRATION RATE: 16 BRPM

## 2021-12-07 DIAGNOSIS — G89.4 CHRONIC PAIN SYNDROME: ICD-10-CM

## 2021-12-07 DIAGNOSIS — E11.42 DIABETIC POLYNEUROPATHY ASSOCIATED WITH TYPE 2 DIABETES MELLITUS (HCC): ICD-10-CM

## 2021-12-07 DIAGNOSIS — M51.36 DDD (DEGENERATIVE DISC DISEASE), LUMBAR: ICD-10-CM

## 2021-12-07 DIAGNOSIS — M48.56XS NONTRAUMATIC COMPRESSION FRACTURE OF L4 VERTEBRA, SEQUELA: ICD-10-CM

## 2021-12-07 PROCEDURE — 99213 OFFICE O/P EST LOW 20 MIN: CPT | Performed by: INTERNAL MEDICINE

## 2021-12-07 RX ORDER — GABAPENTIN 300 MG/1
300 CAPSULE ORAL 2 TIMES DAILY
Qty: 60 CAPSULE | Refills: 1 | Status: SHIPPED | OUTPATIENT
Start: 2021-12-07 | End: 2022-01-04 | Stop reason: SDUPTHER

## 2021-12-07 RX ORDER — BUPRENORPHINE 5 UG/H
1 PATCH TRANSDERMAL WEEKLY
Qty: 4 PATCH | Refills: 0 | Status: SHIPPED | OUTPATIENT
Start: 2021-12-07 | End: 2022-01-04 | Stop reason: SDUPTHER

## 2021-12-07 NOTE — PROGRESS NOTES
Liz HUDDLESTON Re  1940  <[id number]>    HISTORY OF PRESENT ILLNESS:  Ms. Shannon Walls is a 78 y.o. female returns for a follow up visit for multiple medical problems. Her current presenting problems are   1. Chronic pain syndrome    2. DDD (degenerative disc disease), lumbar    3. Diabetic polyneuropathy associated with type 2 diabetes mellitus ([address]    4. Nontraumatic compression fracture of L5 vertebra, sequela    . As per information/history obtained from the PADT(patient assessment and documentation tool) - She complains of pain in the shoulders Bilateral with radiation to the knees Left She rates the pain 4/10 and describes it as shooting. Pain is made worse by: movement. Current treatment regimen has helped relieve about 90% of the pain. She denies side effects from the current pain regimen. Patient reports that since the last follow up visit the physical functioning is better, family/social relationships are better, mood is better and sleep patterns are better, and that the overall functioning is unchanged. Patient denies neurological bowel or bladder. Patient denies misusing/abusing her narcotic pain medications or using any illegal drugs. There are No indicators for possible drug abuse, addiction or diversion problems. Upon obtaining the medical history from Ms. Payne regarding today's office visit for her presenting problems, patient states she has been out of medications due too appointment issues. Ms. Payne states she has been using Butrans along with Neurontin which is helping with the pain. She states she has been walking a little, she is using a walker. ALLERGIES: Patients list of allergies were reviewed     MEDICATIONS: Ms. Payne list of medications were reviewed. Her current medications are   Outpatient Medications Prior to Visit   Medication Sig Dispense Refill    lidocaine (LIDODERM) 5 %       dilTIAZem (CARDIZEM CD) 120 MG extended release capsule       DULoxetine (CYMBALTA) 30 MG extended release capsule Take 1 capsule by mouth daily 30 capsule 0    verapamil (VERELAN) 240 MG extended release capsule Take 240 mg by mouth nightly      baclofen (LIORESAL) 10 MG tablet Take 10 mg by mouth nightly      acetaminophen (TYLENOL) 500 MG tablet Take 500 mg by mouth every 6 hours as needed for Pain      Calcium Carb-Cholecalciferol 600-100 MG-UNIT CAPS Take by mouth      cetirizine (ZYRTEC) 10 MG tablet TAKE ONE TABLET BY MOUTH EVERY DAY      fenofibrate 160 MG tablet TAKE 1 TABLET EVERY MORNING      ferrous sulfate 325 (65 Fe) MG tablet Take 325 mg by mouth      furosemide (LASIX) 40 MG tablet TAKE 1 TABLET EVERY MORNING      indapamide (LOZOL) 1.25 MG tablet TAKE 1 TABLET DAILY      losartan (COZAAR) 100 MG tablet TAKE 1 TABLET TWICE A DAY      metFORMIN (GLUCOPHAGE) 500 MG tablet 500 mg 2 times daily (with meals)       simvastatin (ZOCOR) 20 MG tablet Take 20 mg by mouth nightly       levothyroxine (SYNTHROID) 75 MCG tablet Take 75 mcg by mouth Daily       gabapentin (NEURONTIN) 300 MG capsule Take on capsule BID 60 capsule 0    gabapentin (NEURONTIN) 300 MG capsule Take 1 capsule po nightly for a week then increase to 1 capsule po BID 60 capsule 0     No facility-administered medications prior to visit. REVIEW OF SYSTEMS:    Respiratory: Negative for apnea, chest tightness and shortness of breath or change in baseline breathing. PHYSICAL EXAM:   Nursing note and vitals reviewed. /82 (Site: Left Upper Arm, Position: Sitting, Cuff Size: Medium Adult)   Pulse 124   Resp 16   Ht 4' 11\" (1.499 m)   Wt 190 lb (86.2 kg)   Breastfeeding No   BMI 38.38 kg/m²   Constitutional: She appears well-developed and well-nourished. No acute distress. Cardiovascular: Normal rate, regular rhythm, normal heart sounds, and does not have murmur. Pulmonary/Chest: Effort normal. No respiratory distress. She does not have wheezes in the lung fields. She has no rales. Neurological/Psychiatric:She is alert and oriented to person, place, and time. Coordination is  normal.  Her mood isAppropriate and affect is Neutral/Euthymic(normal) . Other: using a walker, trace edema    IMPRESSION:   1. Chronic pain syndrome    2. DDD (degenerative disc disease), lumbar    3. Diabetic polyneuropathy associated with type 2 diabetes mellitus ([facility] [address]    4. Nontraumatic compression fracture of L5 vertebra, sequela        PLAN:  Informed verbal consent was obtained  -[place] record was obtained and reviewed  for the last one year and no indicators of drug misuse  were found. Any other controlled substance prescriptions  seen on the record have been accounted for, I am aware of the patient receiving these medications. Zoila Lee [place] record will be rechecked as part of office protocol.     -ROM/Stretching exercises as advised   -She was advised to increase fluids ( 5-7  glasses of fluid daily), limit caffeine, avoid cheese products, increase dietary fiber, increase activity and exercise as tolerated and relax regularly and enjoy meals   -Continue with Butrans along with Neurontin 300 mg BID  -Walking 20-30 minutes daily as tolerated      Current Outpatient Medications   Medication Sig Dispense Refill    lidocaine (LIDODERM) 5 %       dilTIAZem (CARDIZEM CD) 120 MG extended release capsule       DULoxetine (CYMBALTA) 30 MG extended release capsule Take 1 capsule by mouth daily 30 capsule 0    verapamil (VERELAN) 240 MG extended release capsule Take 240 mg by mouth nightly      baclofen (LIORESAL) 10 MG tablet Take 10 mg by mouth nightly      acetaminophen (TYLENOL) 500 MG tablet Take 500 mg by mouth every 6 hours as needed for Pain      Calcium Carb-Cholecalciferol 600-100 MG-UNIT CAPS Take by mouth      cetirizine (ZYRTEC) 10 MG tablet TAKE ONE TABLET BY MOUTH EVERY DAY      fenofibrate 160 MG tablet TAKE 1 TABLET EVERY MORNING      ferrous sulfate 325 (65 Fe) MG tablet Take 325 mg by mouth      furosemide (LASIX) 40 MG tablet TAKE 1 TABLET EVERY MORNING      indapamide (LOZOL) 1.25 MG tablet TAKE 1 TABLET DAILY      losartan (COZAAR) 100 MG tablet TAKE 1 TABLET TWICE A DAY      metFORMIN (GLUCOPHAGE) 500 MG tablet 500 mg 2 times daily (with meals)       simvastatin (ZOCOR) 20 MG tablet Take 20 mg by mouth nightly       levothyroxine (SYNTHROID) 75 MCG tablet Take 75 mcg by mouth Daily       gabapentin (NEURONTIN) 300 MG capsule Take on capsule BID 60 capsule 0     No current facility-administered medications for this visit. I will continue her current medication regimen  which is part of the above treatment schedule. It has been helping with Ms. Payne's chronic  medical problems which for this visit include:   Diagnoses of Chronic pain syndrome, DDD (degenerative disc disease), lumbar, Diabetic polyneuropathy associated with type 2 diabetes mellitus ([facility] [address] and Nontraumatic compression fracture of L5 vertebra, sequela were pertinent to this visit. Risks and benefits of the medications and other alternative treatments  including no treatment were discussed with the patient. The common side effects of these medications were also explained to the patient. Informed verbal consent was obtained. Goals of current treatment regimen include improvement in pain, restoration of functioning- with focus on improvement in physical performance, general activity, work or disability,emotional distress, health care utilization and  decreased medication consumption. Will continue to monitor progress towards achieving/maintaining therapeutic goals with special emphasis on  1. Improvement in perceived interfernce  of pain with ADL's. Ability to do home exercises independently. Ability to do household chores indoor and/or outdoor work and social and leisure activities. Improve psychosocial and physical functioning. - she is showing progression towards this treatment goal with the current regimen.      She was advised against drinking alcohol with the narcotic pain medicines, advised against driving or handling machinery while adjusting the dose of medicines or if having cognitive  issues related to the current medications. Risk of overdose and death, if medicines not taken as prescribed, were also discussed. If the patient develops new symptoms or if the symptoms worsen, the patient should call the office. While transcribing every attempt was made to maintain the accuracy of the note in terms of it's contents,there may have been some errors made inadvertently. Thank you for allowing me to participate in the care of this patient.     Malinda Hallman MD.    Cc: Delio Cunningham MD

## 2022-01-04 ENCOUNTER — OFFICE VISIT (OUTPATIENT)
Dept: PAIN MANAGEMENT | Age: 82
End: 2022-01-04
Payer: MEDICARE

## 2022-01-04 VITALS
HEART RATE: 96 BPM | BODY MASS INDEX: 38.71 KG/M2 | TEMPERATURE: 97 F | DIASTOLIC BLOOD PRESSURE: 81 MMHG | HEIGHT: 59 IN | OXYGEN SATURATION: 97 % | WEIGHT: 192 LBS | SYSTOLIC BLOOD PRESSURE: 123 MMHG

## 2022-01-04 DIAGNOSIS — M48.56XS NONTRAUMATIC COMPRESSION FRACTURE OF L4 VERTEBRA, SEQUELA: ICD-10-CM

## 2022-01-04 DIAGNOSIS — E11.42 DIABETIC POLYNEUROPATHY ASSOCIATED WITH TYPE 2 DIABETES MELLITUS (HCC): ICD-10-CM

## 2022-01-04 DIAGNOSIS — M51.36 DDD (DEGENERATIVE DISC DISEASE), LUMBAR: ICD-10-CM

## 2022-01-04 DIAGNOSIS — G89.4 CHRONIC PAIN SYNDROME: ICD-10-CM

## 2022-01-04 PROCEDURE — 99213 OFFICE O/P EST LOW 20 MIN: CPT | Performed by: INTERNAL MEDICINE

## 2022-01-04 RX ORDER — BUPRENORPHINE 5 UG/H
1 PATCH TRANSDERMAL WEEKLY
Qty: 4 PATCH | Refills: 0 | Status: SHIPPED | OUTPATIENT
Start: 2022-01-04 | End: 2022-02-01 | Stop reason: SDUPTHER

## 2022-01-04 RX ORDER — GABAPENTIN 300 MG/1
300 CAPSULE ORAL 2 TIMES DAILY
Qty: 60 CAPSULE | Refills: 1 | Status: SHIPPED | OUTPATIENT
Start: 2022-01-04 | End: 2022-02-01 | Stop reason: SDUPTHER

## 2022-01-04 NOTE — PROGRESS NOTES
Apoorva Payne  1940  <[id number]>    HISTORY OF PRESENT ILLNESS:  Ms. Ana Maria Paris is a 78 y.o. female returns for a follow up visit for multiple medical problems. Her current presenting problems are   1. Chronic pain syndrome    2. Diabetic polyneuropathy associated with type 2 diabetes mellitus ([address]    3. DDD (degenerative disc disease), lumbar    4. Nontraumatic compression fracture of L5 vertebra, sequela    . As per information/history obtained from the PADT(patient assessment and documentation tool) - She complains of pain in the mid back with radiation to the mid back She rates the pain 6/10 and describes it as aching. Pain is made worse by: standing, sitting. Current treatment regimen has helped relieve about 50% of the pain. She denies side effects from the current pain regimen. Patient reports that since the last follow up visit the physical functioning is unchanged, family/social relationships are unchanged, mood is unchanged and sleep patterns are unchanged, and that the overall functioning is unchanged. Patient denies neurological bowel or bladder. Patient denies misusing/abusing her narcotic pain medications or using any illegal drugs. There are No indicators for possible drug abuse, addiction or diversion problems. Upon obtaining the medical history from Ms. Payne regarding today's office visit for her presenting problems, patient states she has been doing fair. Ms. Payne mentions she is using Butrans along with Neurontin 300 mg BID. She states she is managing light house chores. Patient denies any constipation symptoms. Patient reports her weight has been stable. Patient reports her blood sugar has been okay. She is complaining of her back hurting worse than her legs. ALLERGIES: Patients list of allergies were reviewed     MEDICATIONS: Ms. Payne list of medications were reviewed. Her current medications are   Outpatient Medications Prior to Visit   Medication Sig Dispense Refill    gabapentin (NEURONTIN) 300 MG capsule Take 1 capsule by mouth 2 times daily for 30 days. 60 capsule 1    buprenorphine (BUTRANS) 5 MCG/HR PTWK Place 1 patch onto the skin once a week for 28 days. 4 patch 0    lidocaine (LIDODERM) 5 %       dilTIAZem (CARDIZEM CD) 120 MG extended release capsule       verapamil (VERELAN) 240 MG extended release capsule Take 240 mg by mouth nightly      baclofen (LIORESAL) 10 MG tablet Take 10 mg by mouth nightly      acetaminophen (TYLENOL) 500 MG tablet Take 500 mg by mouth every 6 hours as needed for Pain      Calcium Carb-Cholecalciferol 600-100 MG-UNIT CAPS Take by mouth      cetirizine (ZYRTEC) 10 MG tablet TAKE ONE TABLET BY MOUTH EVERY DAY      fenofibrate 160 MG tablet TAKE 1 TABLET EVERY MORNING      ferrous sulfate 325 (65 Fe) MG tablet Take 325 mg by mouth      furosemide (LASIX) 40 MG tablet TAKE 1 TABLET EVERY MORNING      indapamide (LOZOL) 1.25 MG tablet TAKE 1 TABLET DAILY      losartan (COZAAR) 100 MG tablet TAKE 1 TABLET TWICE A DAY      metFORMIN (GLUCOPHAGE) 500 MG tablet 500 mg 2 times daily (with meals)       simvastatin (ZOCOR) 20 MG tablet Take 20 mg by mouth nightly       levothyroxine (SYNTHROID) 75 MCG tablet Take 75 mcg by mouth Daily        No facility-administered medications prior to visit. REVIEW OF SYSTEMS:    Respiratory: Negative for apnea, chest tightness and shortness of breath or change in baseline breathing. PHYSICAL EXAM:   Nursing note and vitals reviewed. /81   Pulse 96   Temp 97 °F (36.1 °C) (Temporal)   Ht 4' 11\" (1.499 m)   Wt 192 lb (87.1 kg)   SpO2 97%   BMI 38.78 kg/m²   Constitutional: She appears well-developed and well-nourished. No acute distress. Cardiovascular: Normal rate, regular rhythm, normal heart sounds, and does not have murmur. Pulmonary/Chest: Effort normal. No respiratory distress. She does not have wheezes in the lung fields. She has no rales.      Neurological/Psychiatric:She is alert and oriented to person, place, and time. Coordination is  normal.  Her mood isAppropriate and affect is Neutral/Euthymic(normal) . Other: using a walker    IMPRESSION:   1. Chronic pain syndrome    2. Diabetic polyneuropathy associated with type 2 diabetes mellitus ([address]    3. DDD (degenerative disc disease), lumbar    4. Nontraumatic compression fracture of L5 vertebra, sequela        PLAN:  Informed verbal consent was obtained  -[place] record was obtained and reviewed  for the last one year and no indicators of drug misuse  were found. Any other controlled substance prescriptions  seen on the record have been accounted for, I am aware of the patient receiving these medications. Mariia Forde [place] record will be rechecked as part of office protocol. -ROM/Stretching exercises as advised   -She was advised to increase fluids ( 5-7  glasses of fluid daily), limit caffeine, avoid cheese products, increase dietary fiber, increase activity and exercise as tolerated and relax regularly and enjoy meals   -Walking as tolerated    -Continue with Butrans Q 7 days   -Continue with Neurontin 300 mg BID      Current Outpatient Medications   Medication Sig Dispense Refill    gabapentin (NEURONTIN) 300 MG capsule Take 1 capsule by mouth 2 times daily for 30 days. 60 capsule 1    buprenorphine (BUTRANS) 5 MCG/HR PTWK Place 1 patch onto the skin once a week for 28 days.  4 patch 0    lidocaine (LIDODERM) 5 %       dilTIAZem (CARDIZEM CD) 120 MG extended release capsule       verapamil (VERELAN) 240 MG extended release capsule Take 240 mg by mouth nightly      baclofen (LIORESAL) 10 MG tablet Take 10 mg by mouth nightly      acetaminophen (TYLENOL) 500 MG tablet Take 500 mg by mouth every 6 hours as needed for Pain      Calcium Carb-Cholecalciferol 600-100 MG-UNIT CAPS Take by mouth      cetirizine (ZYRTEC) 10 MG tablet TAKE ONE TABLET BY MOUTH EVERY DAY      fenofibrate 160 MG tablet TAKE 1 TABLET EVERY MORNING      ferrous sulfate 325 (65 Fe) MG tablet Take 325 mg by mouth      furosemide (LASIX) 40 MG tablet TAKE 1 TABLET EVERY MORNING      indapamide (LOZOL) 1.25 MG tablet TAKE 1 TABLET DAILY      losartan (COZAAR) 100 MG tablet TAKE 1 TABLET TWICE A DAY      metFORMIN (GLUCOPHAGE) 500 MG tablet 500 mg 2 times daily (with meals)       simvastatin (ZOCOR) 20 MG tablet Take 20 mg by mouth nightly       levothyroxine (SYNTHROID) 75 MCG tablet Take 75 mcg by mouth Daily        No current facility-administered medications for this visit. I will continue her current medication regimen  which is part of the above treatment schedule. It has been helping with Ms. Payne's chronic  medical problems which for this visit include:   Diagnoses of Chronic pain syndrome, Diabetic polyneuropathy associated with type 2 diabetes mellitus ([facility] [address] DDD (degenerative disc disease), lumbar, and Nontraumatic compression fracture of L5 vertebra, sequela were pertinent to this visit. Risks and benefits of the medications and other alternative treatments  including no treatment were discussed with the patient. The common side effects of these medications were also explained to the patient. Informed verbal consent was obtained. Goals of current treatment regimen include improvement in pain, restoration of functioning- with focus on improvement in physical performance, general activity, work or disability,emotional distress, health care utilization and  decreased medication consumption. Will continue to monitor progress towards achieving/maintaining therapeutic goals with special emphasis on  1. Improvement in perceived interfernce  of pain with ADL's. Ability to do home exercises independently. Ability to do household chores indoor and/or outdoor work and social and leisure activities. Improve psychosocial and physical functioning. - she is showing progression towards this treatment goal with the current regimen.      She was advised against drinking alcohol with the narcotic pain medicines, advised against driving or handling machinery while adjusting the dose of medicines or if having cognitive  issues related to the current medications. Risk of overdose and death, if medicines not taken as prescribed, were also discussed. If the patient develops new symptoms or if the symptoms worsen, the patient should call the office. While transcribing every attempt was made to maintain the accuracy of the note in terms of it's contents,there may have been some errors made inadvertently. Thank you for allowing me to participate in the care of this patient.     Nicole Durham MD.    Cc: Gale Umana MD

## 2022-02-01 ENCOUNTER — OFFICE VISIT (OUTPATIENT)
Dept: PAIN MANAGEMENT | Age: 82
End: 2022-02-01
Payer: MEDICARE

## 2022-02-01 VITALS
OXYGEN SATURATION: 96 % | HEIGHT: 59 IN | WEIGHT: 192 LBS | HEART RATE: 97 BPM | SYSTOLIC BLOOD PRESSURE: 121 MMHG | DIASTOLIC BLOOD PRESSURE: 85 MMHG | BODY MASS INDEX: 38.71 KG/M2

## 2022-02-01 DIAGNOSIS — M48.56XS NONTRAUMATIC COMPRESSION FRACTURE OF L4 VERTEBRA, SEQUELA: ICD-10-CM

## 2022-02-01 DIAGNOSIS — G89.4 CHRONIC PAIN SYNDROME: ICD-10-CM

## 2022-02-01 DIAGNOSIS — E11.42 DIABETIC POLYNEUROPATHY ASSOCIATED WITH TYPE 2 DIABETES MELLITUS (HCC): ICD-10-CM

## 2022-02-01 DIAGNOSIS — M51.36 DDD (DEGENERATIVE DISC DISEASE), LUMBAR: ICD-10-CM

## 2022-02-01 PROCEDURE — 99213 OFFICE O/P EST LOW 20 MIN: CPT | Performed by: INTERNAL MEDICINE

## 2022-02-01 RX ORDER — GABAPENTIN 300 MG/1
300 CAPSULE ORAL 2 TIMES DAILY
Qty: 60 CAPSULE | Refills: 1 | Status: SHIPPED | OUTPATIENT
Start: 2022-02-01 | End: 2022-03-01 | Stop reason: SDUPTHER

## 2022-02-01 RX ORDER — BUPRENORPHINE 5 UG/H
1 PATCH TRANSDERMAL WEEKLY
Qty: 4 PATCH | Refills: 0 | Status: SHIPPED | OUTPATIENT
Start: 2022-02-01 | End: 2022-03-01 | Stop reason: SDUPTHER

## 2022-02-01 NOTE — PROGRESS NOTES
Skinny HUDDLESTON Re  1940  <[id number]>    HISTORY OF PRESENT ILLNESS:  Ms. Ortiz Ross is a 78 y.o. female returns for a follow up visit for multiple medical problems. Her current presenting problems are   1. Chronic pain syndrome    2. DDD (degenerative disc disease), lumbar    3. Diabetic polyneuropathy associated with type 2 diabetes mellitus ([address]    4. Nontraumatic compression fracture of L5 vertebra, sequela    . As per information/history obtained from the PADT(patient assessment and documentation tool) - She complains of pain in the mid back with radiation to the mid back She rates the pain 4/10 and describes it as aching. Pain is made worse by: movement, standing, lifting. Current treatment regimen has helped relieve about 50% of the pain. She denies side effects from the current pain regimen. Patient reports that since the last follow up visit the physical functioning is unchanged, family/social relationships are unchanged, mood is unchanged and sleep patterns are unchanged, and that the overall functioning is unchanged. Patient denies neurological bowel or bladder. Patient denies misusing/abusing her narcotic pain medications or using any illegal drugs. There are No indicators for possible drug abuse, addiction or diversion problems. Upon obtaining the medical history from Ms. Payne regarding today's office visit for her presenting problems, patient states she has been doing fair, she is feeling more \"limber. \" Ms Payne states she is using Neurontin along with Butrans. Patient denies any side effects with the medications. Patient denies any constipation symptoms. She says she is using a walker for ambulation. Patient reports she has been managing light house chores. ALLERGIES: Patients list of allergies were reviewed     MEDICATIONS: Ms. Payne list of medications were reviewed. Her current medications are   Outpatient Medications Prior to Visit   Medication Sig Dispense Refill    lidocaine (LIDODERM) 5 %       dilTIAZem (CARDIZEM CD) 120 MG extended release capsule       verapamil (VERELAN) 240 MG extended release capsule Take 240 mg by mouth nightly      baclofen (LIORESAL) 10 MG tablet Take 10 mg by mouth nightly      acetaminophen (TYLENOL) 500 MG tablet Take 500 mg by mouth every 6 hours as needed for Pain      Calcium Carb-Cholecalciferol 600-100 MG-UNIT CAPS Take by mouth      cetirizine (ZYRTEC) 10 MG tablet TAKE ONE TABLET BY MOUTH EVERY DAY      fenofibrate 160 MG tablet TAKE 1 TABLET EVERY MORNING      ferrous sulfate 325 (65 Fe) MG tablet Take 325 mg by mouth      furosemide (LASIX) 40 MG tablet TAKE 1 TABLET EVERY MORNING      indapamide (LOZOL) 1.25 MG tablet TAKE 1 TABLET DAILY      losartan (COZAAR) 100 MG tablet TAKE 1 TABLET TWICE A DAY      metFORMIN (GLUCOPHAGE) 500 MG tablet 500 mg 2 times daily (with meals)       simvastatin (ZOCOR) 20 MG tablet Take 20 mg by mouth nightly       levothyroxine (SYNTHROID) 75 MCG tablet Take 75 mcg by mouth Daily       gabapentin (NEURONTIN) 300 MG capsule Take 1 capsule by mouth 2 times daily for 30 days. 60 capsule 1    buprenorphine (BUTRANS) 5 MCG/HR PTWK Place 1 patch onto the skin once a week for 28 days. 4 patch 0     No facility-administered medications prior to visit. REVIEW OF SYSTEMS:    Respiratory: Negative for apnea, chest tightness and shortness of breath or change in baseline breathing. PHYSICAL EXAM:   Nursing note and vitals reviewed. /85   Pulse 97   Ht 4' 11\" (1.499 m)   Wt 192 lb (87.1 kg)   SpO2 96%   BMI 38.78 kg/m²   Constitutional: She appears well-developed and well-nourished. No acute distress. Cardiovascular: Normal rate, regular rhythm, normal heart sounds, and does not have murmur. Pulmonary/Chest: Effort normal. No respiratory distress. She does not have wheezes in the lung fields. She has no rales. Neurological/Psychiatric:She is alert and oriented to person, place, and time.  Coordination is normal.  Her mood isAppropriate and affect is Neutral/Euthymic(normal) . Other: using a walker     IMPRESSION:   1. Chronic pain syndrome    2. DDD (degenerative disc disease), lumbar    3. Diabetic polyneuropathy associated with type 2 diabetes mellitus ([address]    4. Nontraumatic compression fracture of L5 vertebra, sequela        PLAN:  Informed verbal consent was obtained  -[place] record was obtained and reviewed  for the last one year and no indicators of drug misuse  were found. Any other controlled substance prescriptions  seen on the record have been accounted for, I am aware of the patient receiving these medications. Fredy Lim [place] record will be rechecked as part of office protocol. -ROM/Stretching exercises as advised   -She was advised to increase fluids ( 5-7  glasses of fluid daily), limit caffeine, avoid cheese products, increase dietary fiber, increase activity and exercise as tolerated and relax regularly and enjoy meals   -Continue with Butrans 5 mcg Q 7 days      Current Outpatient Medications   Medication Sig Dispense Refill    gabapentin (NEURONTIN) 300 MG capsule Take 1 capsule by mouth 2 times daily for 30 days. 60 capsule 1    buprenorphine (BUTRANS) 5 MCG/HR PTWK Place 1 patch onto the skin once a week for 28 days.  4 patch 0    lidocaine (LIDODERM) 5 %       dilTIAZem (CARDIZEM CD) 120 MG extended release capsule       verapamil (VERELAN) 240 MG extended release capsule Take 240 mg by mouth nightly      baclofen (LIORESAL) 10 MG tablet Take 10 mg by mouth nightly      acetaminophen (TYLENOL) 500 MG tablet Take 500 mg by mouth every 6 hours as needed for Pain      Calcium Carb-Cholecalciferol 600-100 MG-UNIT CAPS Take by mouth      cetirizine (ZYRTEC) 10 MG tablet TAKE ONE TABLET BY MOUTH EVERY DAY      fenofibrate 160 MG tablet TAKE 1 TABLET EVERY MORNING      ferrous sulfate 325 (65 Fe) MG tablet Take 325 mg by mouth      furosemide (LASIX) 40 MG tablet TAKE 1 TABLET EVERY MORNING  indapamide (LOZOL) 1.25 MG tablet TAKE 1 TABLET DAILY      losartan (COZAAR) 100 MG tablet TAKE 1 TABLET TWICE A DAY      metFORMIN (GLUCOPHAGE) 500 MG tablet 500 mg 2 times daily (with meals)       simvastatin (ZOCOR) 20 MG tablet Take 20 mg by mouth nightly       levothyroxine (SYNTHROID) 75 MCG tablet Take 75 mcg by mouth Daily        No current facility-administered medications for this visit. I will continue her current medication regimen  which is part of the above treatment schedule. It has been helping with Ms. Payne's chronic  medical problems which for this visit include:   Diagnoses of Chronic pain syndrome, DDD (degenerative disc disease), lumbar, Diabetic polyneuropathy associated with type 2 diabetes mellitus ([facility] [address] and Nontraumatic compression fracture of L5 vertebra, sequela were pertinent to this visit. Risks and benefits of the medications and other alternative treatments  including no treatment were discussed with the patient. The common side effects of these medications were also explained to the patient. Informed verbal consent was obtained. Goals of current treatment regimen include improvement in pain, restoration of functioning- with focus on improvement in physical performance, general activity, work or disability,emotional distress, health care utilization and  decreased opioid medication consumption- titrating to the lowest effective dose. Will continue to monitor progress towards achieving/maintaining therapeutic goals with special emphasis on  1. Improvement in perceived interfernce  of pain with ADL's. Ability to do home exercises independently. Ability to do household chores indoor and/or outdoor work and social and leisure activities. Improve psychosocial and physical functioning. - she is showing progression towards this treatment goal with the current regimen.      She was advised against drinking alcohol with the narcotic pain medicines, advised against driving or handling machinery while adjusting the dose of medicines or if having cognitive  issues related to the current medications. Risk of overdose and death, if medicines not taken as prescribed, were also discussed. If the patient develops new symptoms or if the symptoms worsen, the patient should call the office. While transcribing every attempt was made to maintain the accuracy of the note in terms of it's contents,there may have been some errors made inadvertently. Thank you for allowing me to participate in the care of this patient.     Adan Garcia MD.    Cc: Mateus Mukherjee MD

## 2022-03-01 ENCOUNTER — OFFICE VISIT (OUTPATIENT)
Dept: PAIN MANAGEMENT | Age: 82
End: 2022-03-01
Payer: MEDICARE

## 2022-03-01 VITALS
HEART RATE: 92 BPM | TEMPERATURE: 96 F | WEIGHT: 192 LBS | DIASTOLIC BLOOD PRESSURE: 77 MMHG | BODY MASS INDEX: 38.71 KG/M2 | SYSTOLIC BLOOD PRESSURE: 119 MMHG | OXYGEN SATURATION: 98 % | HEIGHT: 59 IN

## 2022-03-01 DIAGNOSIS — M48.56XS NONTRAUMATIC COMPRESSION FRACTURE OF L4 VERTEBRA, SEQUELA: ICD-10-CM

## 2022-03-01 DIAGNOSIS — G89.4 CHRONIC PAIN SYNDROME: ICD-10-CM

## 2022-03-01 DIAGNOSIS — E11.42 DIABETIC POLYNEUROPATHY ASSOCIATED WITH TYPE 2 DIABETES MELLITUS (HCC): ICD-10-CM

## 2022-03-01 DIAGNOSIS — M51.36 DDD (DEGENERATIVE DISC DISEASE), LUMBAR: ICD-10-CM

## 2022-03-01 PROCEDURE — 99213 OFFICE O/P EST LOW 20 MIN: CPT | Performed by: INTERNAL MEDICINE

## 2022-03-01 RX ORDER — GABAPENTIN 300 MG/1
300 CAPSULE ORAL 2 TIMES DAILY
Qty: 60 CAPSULE | Refills: 1 | Status: SHIPPED | OUTPATIENT
Start: 2022-03-01 | End: 2022-03-30 | Stop reason: SDUPTHER

## 2022-03-01 RX ORDER — BUPRENORPHINE 5 UG/H
1 PATCH TRANSDERMAL WEEKLY
Qty: 4 PATCH | Refills: 0 | Status: SHIPPED | OUTPATIENT
Start: 2022-03-01 | End: 2022-03-30 | Stop reason: SDUPTHER

## 2022-03-01 NOTE — PROGRESS NOTES
Krystle HUDDLESTON Re  1940  <[id number]>    HISTORY OF PRESENT ILLNESS:  Ms. Lucio Bass is a 78 y.o. female returns for a follow up visit for multiple medical problems. Her current presenting problems are   1. Chronic pain syndrome    2. Diabetic polyneuropathy associated with type 2 diabetes mellitus ([facility] [address]    3. DDD (degenerative disc disease), lumbar    4. Nontraumatic compression fracture of L5 vertebra, sequela    . As per information/history obtained from the PADT(patient assessment and documentation tool) - She complains of pain in the mid back with radiation to the mid back She rates the pain 4/10 and describes it as aching. Pain is made worse by: cold. Current treatment regimen has helped relieve about 60% of the pain. She denies side effects from the current pain regimen. Patient reports that since the last follow up visit the physical functioning is better, family/social relationships are better, mood is better and sleep patterns are unchanged, and that the overall functioning is better. Patient denies neurological bowel or bladder. Patient denies misusing/abusing her narcotic pain medications or using any illegal drugs. There are No indicators for possible drug abuse, addiction or diversion problems. Upon obtaining the medical history from Ms. Payne regarding today's office visit for her presenting problems, patient states she has been doing fair, pain has been manageable with the medications. She says she is using Butrans along with Neurontin. She says she has been walking some daily. She reports she lives on her own and is managing her ADLs. She states she is using a walker for ambulation. ALLERGIES: Patients list of allergies were reviewed     MEDICATIONS: Ms. Payne list of medications were reviewed. Her current medications are   Outpatient Medications Prior to Visit   Medication Sig Dispense Refill    lidocaine (LIDODERM) 5 %       dilTIAZem (CARDIZEM CD) 120 MG extended release capsule       verapamil (VERELAN) 240 MG extended release capsule Take 240 mg by mouth nightly      baclofen (LIORESAL) 10 MG tablet Take 10 mg by mouth nightly      acetaminophen (TYLENOL) 500 MG tablet Take 500 mg by mouth every 6 hours as needed for Pain      Calcium Carb-Cholecalciferol 600-100 MG-UNIT CAPS Take by mouth      cetirizine (ZYRTEC) 10 MG tablet TAKE ONE TABLET BY MOUTH EVERY DAY      fenofibrate 160 MG tablet TAKE 1 TABLET EVERY MORNING      ferrous sulfate 325 (65 Fe) MG tablet Take 325 mg by mouth      furosemide (LASIX) 40 MG tablet TAKE 1 TABLET EVERY MORNING      indapamide (LOZOL) 1.25 MG tablet TAKE 1 TABLET DAILY      losartan (COZAAR) 100 MG tablet TAKE 1 TABLET TWICE A DAY      metFORMIN (GLUCOPHAGE) 500 MG tablet 500 mg 2 times daily (with meals)       simvastatin (ZOCOR) 20 MG tablet Take 20 mg by mouth nightly       levothyroxine (SYNTHROID) 75 MCG tablet Take 75 mcg by mouth Daily       gabapentin (NEURONTIN) 300 MG capsule Take 1 capsule by mouth 2 times daily for 30 days. 60 capsule 1    buprenorphine (BUTRANS) 5 MCG/HR PTWK Place 1 patch onto the skin once a week for 28 days. 4 patch 0     No facility-administered medications prior to visit. REVIEW OF SYSTEMS:    Respiratory: Negative for apnea, chest tightness and shortness of breath or change in baseline breathing. PHYSICAL EXAM:   Nursing note and vitals reviewed. /77   Pulse 92   Temp 96 °F (35.6 °C)   Ht 4' 11\" (1.499 m)   Wt 192 lb (87.1 kg)   SpO2 98%   Breastfeeding No   BMI 38.78 kg/m²   Constitutional: She appears well-developed and well-nourished. No acute distress. Cardiovascular: Normal rate, regular rhythm, normal heart sounds, and does not have murmur. Pulmonary/Chest: Effort normal. No respiratory distress. She does not have wheezes in the lung fields. She has no rales. Neurological/Psychiatric:She is alert and oriented to person, place, and time.  Coordination is  normal.  Her mood isAppropriate and affect is Neutral/Euthymic(normal) . IMPRESSION:   1. Chronic pain syndrome    2. Diabetic polyneuropathy associated with type 2 diabetes mellitus ([address]    3. DDD (degenerative disc disease), lumbar    4. Nontraumatic compression fracture of L5 vertebra, sequela        PLAN:  Informed verbal consent was obtained  -[place] record was obtained and reviewed  for the last one year and no indicators of drug misuse  were found. Any other controlled substance prescriptions  seen on the record have been accounted for, I am aware of the patient receiving these medications. Pedro Camacho [place] record will be rechecked as part of office protocol. -ROM/Stretching exercises as advised   -She was advised to increase fluids ( 5-7  glasses of fluid daily), limit caffeine, avoid cheese products, increase dietary fiber, increase activity and exercise as tolerated and relax regularly and enjoy meals   -She was advised weight reduction, diet changes- 800-1200 shiraz diet, diet diary, exercising, nutritional  consult increased physical activity as tolerated   -Walking as tolerated   -Continue with Butrans along with Neurontin same dose    Current Outpatient Medications   Medication Sig Dispense Refill    gabapentin (NEURONTIN) 300 MG capsule Take 1 capsule by mouth 2 times daily for 30 days. 60 capsule 1    buprenorphine (BUTRANS) 5 MCG/HR PTWK Place 1 patch onto the skin once a week for 28 days.  4 patch 0    lidocaine (LIDODERM) 5 %       dilTIAZem (CARDIZEM CD) 120 MG extended release capsule       verapamil (VERELAN) 240 MG extended release capsule Take 240 mg by mouth nightly      baclofen (LIORESAL) 10 MG tablet Take 10 mg by mouth nightly      acetaminophen (TYLENOL) 500 MG tablet Take 500 mg by mouth every 6 hours as needed for Pain      Calcium Carb-Cholecalciferol 600-100 MG-UNIT CAPS Take by mouth      cetirizine (ZYRTEC) 10 MG tablet TAKE ONE TABLET BY MOUTH EVERY DAY      fenofibrate 160 MG tablet TAKE 1 TABLET EVERY MORNING      ferrous sulfate 325 (65 Fe) MG tablet Take 325 mg by mouth      furosemide (LASIX) 40 MG tablet TAKE 1 TABLET EVERY MORNING      indapamide (LOZOL) 1.25 MG tablet TAKE 1 TABLET DAILY      losartan (COZAAR) 100 MG tablet TAKE 1 TABLET TWICE A DAY      metFORMIN (GLUCOPHAGE) 500 MG tablet 500 mg 2 times daily (with meals)       simvastatin (ZOCOR) 20 MG tablet Take 20 mg by mouth nightly       levothyroxine (SYNTHROID) 75 MCG tablet Take 75 mcg by mouth Daily        No current facility-administered medications for this visit. I will continue her current medication regimen  which is part of the above treatment schedule. It has been helping with Ms. Payne's chronic  medical problems which for this visit include:   Diagnoses of Chronic pain syndrome, Diabetic polyneuropathy associated with type 2 diabetes mellitus ([address] DDD (degenerative disc disease), lumbar, and Nontraumatic compression fracture of L5 vertebra, sequela were pertinent to this visit. Risks and benefits of the medications and other alternative treatments  including no treatment were discussed with the patient. The common side effects of these medications were also explained to the patient. Informed verbal consent was obtained. Goals of current treatment regimen include improvement in pain, restoration of functioning- with focus on improvement in physical performance, general activity, work or disability,emotional distress, health care utilization and  decreased opioid medication consumption- titrating to the lowest effective dose. Will continue to monitor progress towards achieving/maintaining therapeutic goals with special emphasis on  1. Improvement in perceived interfernce  of pain with ADL's. Ability to do home exercises independently. Ability to do household chores indoor and/or outdoor work and social and leisure activities. Improve psychosocial and physical functioning. - she is showing progression towards this treatment goal with the current regimen. She was advised against drinking alcohol with the narcotic pain medicines, advised against driving or handling machinery while adjusting the dose of medicines or if having cognitive  issues related to the current medications. Risk of overdose and death, if medicines not taken as prescribed, were also discussed. If the patient develops new symptoms or if the symptoms worsen, the patient should call the office. While transcribing every attempt was made to maintain the accuracy of the note in terms of it's contents,there may have been some errors made inadvertently. Thank you for allowing me to participate in the care of this patient.     Mayra Leal MD.    Cc: Thai Dan MD

## 2022-03-30 ENCOUNTER — OFFICE VISIT (OUTPATIENT)
Dept: PAIN MANAGEMENT | Age: 82
End: 2022-03-30
Payer: MEDICARE

## 2022-03-30 VITALS
OXYGEN SATURATION: 97 % | WEIGHT: 192 LBS | SYSTOLIC BLOOD PRESSURE: 144 MMHG | HEART RATE: 107 BPM | HEIGHT: 59 IN | DIASTOLIC BLOOD PRESSURE: 73 MMHG | TEMPERATURE: 96 F | BODY MASS INDEX: 38.71 KG/M2

## 2022-03-30 DIAGNOSIS — G89.4 CHRONIC PAIN SYNDROME: ICD-10-CM

## 2022-03-30 DIAGNOSIS — E11.42 DIABETIC POLYNEUROPATHY ASSOCIATED WITH TYPE 2 DIABETES MELLITUS (HCC): ICD-10-CM

## 2022-03-30 DIAGNOSIS — M51.36 DDD (DEGENERATIVE DISC DISEASE), LUMBAR: ICD-10-CM

## 2022-03-30 DIAGNOSIS — M48.56XS NONTRAUMATIC COMPRESSION FRACTURE OF L4 VERTEBRA, SEQUELA: ICD-10-CM

## 2022-03-30 PROCEDURE — 99213 OFFICE O/P EST LOW 20 MIN: CPT | Performed by: INTERNAL MEDICINE

## 2022-03-30 RX ORDER — GABAPENTIN 300 MG/1
300 CAPSULE ORAL 2 TIMES DAILY
Qty: 60 CAPSULE | Refills: 1 | Status: SHIPPED | OUTPATIENT
Start: 2022-03-30 | End: 2022-05-24 | Stop reason: SDUPTHER

## 2022-03-30 RX ORDER — LIDOCAINE 50 MG/G
PATCH TOPICAL
Qty: 60 PATCH | Refills: 0 | Status: SHIPPED | OUTPATIENT
Start: 2022-03-30 | End: 2022-05-24 | Stop reason: SDUPTHER

## 2022-03-30 RX ORDER — BUPRENORPHINE 5 UG/H
1 PATCH TRANSDERMAL WEEKLY
Qty: 4 PATCH | Refills: 1 | Status: SHIPPED | OUTPATIENT
Start: 2022-03-30 | End: 2022-05-24 | Stop reason: SDUPTHER

## 2022-03-30 NOTE — PROGRESS NOTES
Bam Payne  1940  [id number]      HISTORY OF PRESENT ILLNESS:  Ms. Ortiz Pabon is a 78 y.o. female returns for a follow up visit for pain management  She has a diagnosis of   1. Chronic pain syndrome    2. DDD (degenerative disc disease), lumbar    3. Diabetic polyneuropathy associated with type 2 diabetes mellitus ([address]    4. Nontraumatic compression fracture of L5 vertebra, sequela    . She complains of pain in the Low back, buttock  She rates the pain 4/10 and describes it as aching. Current treatment regimen has helped relieve about 50% of the pain. She denies any side effects from the current pain regimen. Patient reports that since the last follow up visit the physical functioning is unchanged, family/social relationships are unchanged, mood is unchanged sleep patterns are unchanged, and that the overall functioning is unchanged. Patient denies misusing/abusing her narcotic pain medications or using any illegal drugs. There are No indicators for possible drug abuse, addiction or diversion problems. Patient states the pain has been manageable,but the back hurts the most. She says she is walking with a walker. She denies much leg pain. She says she is using Butral along with Neurontin 300 mg BID. ALLERGIES: Patients list of allergies were reviewed     MEDICATIONS: Ms. Payne list of medications were reviewed. Her current medications are   Outpatient Medications Prior to Visit   Medication Sig Dispense Refill    dilTIAZem (CARDIZEM CD) 120 MG extended release capsule       verapamil (VERELAN) 240 MG extended release capsule Take 240 mg by mouth nightly      baclofen (LIORESAL) 10 MG tablet Take 10 mg by mouth nightly      acetaminophen (TYLENOL) 500 MG tablet Take 500 mg by mouth every 6 hours as needed for Pain      Calcium Carb-Cholecalciferol 600-100 MG-UNIT CAPS Take by mouth      cetirizine (ZYRTEC) 10 MG tablet TAKE ONE TABLET BY MOUTH EVERY DAY      fenofibrate 160 MG tablet TAKE 1 TABLET EVERY MORNING      ferrous sulfate 325 (65 Fe) MG tablet Take 325 mg by mouth      furosemide (LASIX) 40 MG tablet TAKE 1 TABLET EVERY MORNING      indapamide (LOZOL) 1.25 MG tablet TAKE 1 TABLET DAILY      losartan (COZAAR) 100 MG tablet TAKE 1 TABLET TWICE A DAY      metFORMIN (GLUCOPHAGE) 500 MG tablet 500 mg 2 times daily (with meals)       simvastatin (ZOCOR) 20 MG tablet Take 20 mg by mouth nightly       levothyroxine (SYNTHROID) 75 MCG tablet Take 75 mcg by mouth Daily       gabapentin (NEURONTIN) 300 MG capsule Take 1 capsule by mouth 2 times daily for 30 days. 60 capsule 1    buprenorphine (BUTRANS) 5 MCG/HR PTWK Place 1 patch onto the skin once a week for 28 days. 4 patch 0    lidocaine (LIDODERM) 5 %        No facility-administered medications prior to visit. REVIEW OF SYSTEMS:    Respiratory: Negative for apnea, chest tightness and shortness of breath or change in baseline breathing. PHYSICAL EXAM:   Nursing note and vitals reviewed. BP (!) 144/73   Pulse 107   Temp 96 °F (35.6 °C)   Ht 4' 11\" (1.499 m)   Wt 192 lb (87.1 kg)   SpO2 97%   BMI 38.78 kg/m²   Constitutional: She appears well-developed and well-nourished. No acute distress. Cardiovascular: Normal rate, regular rhythm, normal heart sounds, and does not have murmur. Pulmonary/Chest: Effort normal. No respiratory distress. She does not have wheezes in the lung fields. She has no rales. Neurological/Psychiatric:She is alert and oriented to person, place, and time. Coordination is  normal.  Her mood isAppropriate and affect is Neutral/Euthymic(normal) . Other: using a walker, + slow gait   IMPRESSION:   1. Chronic pain syndrome    2. DDD (degenerative disc disease), lumbar    3. Diabetic polyneuropathy associated with type 2 diabetes mellitus ([facility] [address]    4.  Nontraumatic compression fracture of L5 vertebra, sequela        PLAN:  Informed verbal consent was obtained  -[place] record was obtained and reviewed  for the last one year and no indicators of drug misuse  were found. Any other controlled substance prescriptions  seen on the record have been accounted for, I am aware of the patient receiving these medications. Ashley Valenzuela [place] record will be rechecked as part of office protocol. -ROM/Stretching exercises as advised   -Continue with Butrans 5 mcg every 7 days   -Continue with Neurontin 300 mg BID   -Walking as tolerated   Current Outpatient Medications   Medication Sig Dispense Refill    gabapentin (NEURONTIN) 300 MG capsule Take 1 capsule by mouth 2 times daily for 30 days. 60 capsule 1    buprenorphine (BUTRANS) 5 MCG/HR PTWK Place 1 patch onto the skin once a week for 28 days. 4 patch 1    lidocaine (LIDODERM) 5 % Apply one patch to skin daily 12 hrs on and 12 hrs off 60 patch 0    dilTIAZem (CARDIZEM CD) 120 MG extended release capsule       verapamil (VERELAN) 240 MG extended release capsule Take 240 mg by mouth nightly      baclofen (LIORESAL) 10 MG tablet Take 10 mg by mouth nightly      acetaminophen (TYLENOL) 500 MG tablet Take 500 mg by mouth every 6 hours as needed for Pain      Calcium Carb-Cholecalciferol 600-100 MG-UNIT CAPS Take by mouth      cetirizine (ZYRTEC) 10 MG tablet TAKE ONE TABLET BY MOUTH EVERY DAY      fenofibrate 160 MG tablet TAKE 1 TABLET EVERY MORNING      ferrous sulfate 325 (65 Fe) MG tablet Take 325 mg by mouth      furosemide (LASIX) 40 MG tablet TAKE 1 TABLET EVERY MORNING      indapamide (LOZOL) 1.25 MG tablet TAKE 1 TABLET DAILY      losartan (COZAAR) 100 MG tablet TAKE 1 TABLET TWICE A DAY      metFORMIN (GLUCOPHAGE) 500 MG tablet 500 mg 2 times daily (with meals)       simvastatin (ZOCOR) 20 MG tablet Take 20 mg by mouth nightly       levothyroxine (SYNTHROID) 75 MCG tablet Take 75 mcg by mouth Daily        No current facility-administered medications for this visit. I will continue her current medication regimen  which is part of the above treatment schedule.  It has been helping with Ms. Payne's chronic  medical problems which for this visit include:   Diagnoses of Chronic pain syndrome, DDD (degenerative disc disease), lumbar, Diabetic polyneuropathy associated with type 2 diabetes mellitus ([address] and Nontraumatic compression fracture of L5 vertebra, sequela were pertinent to this visit. Risks and benefits of the medications and other alternative treatments  including no treatment were discussed with the patient. The common side effects of these medications were also explained to the patient. Informed verbal consent was obtained. Goals of current treatment regimen include improvement in pain, restoration of functioning- with focus on improvement in physical performance, general activity, work or disability,emotional distress, health care utilization and  decreased medication consumption. Will continue to monitor progress towards achieving/maintaining therapeutic goals with special emphasis on  1. Improvement in perceived interfernce  of pain with ADL's. Ability to do home exercises independently. Ability to do household chores indoor and/or outdoor work and social and leisure activities. Improve psychosocial and physical functioning. - she is showing progression towards this treatment goal with the current regimen. She was advised against drinking alcohol with the narcotic pain medicines, advised against driving or handling machinery while adjusting the dose of medicines or if having cognitive  issues related to the current medications. Risk of overdose and death, if medicines not taken as prescribed, were also discussed. If the patient develops new symptoms or if the symptoms worsen, the patient should call the office. While transcribing every attempt was made to maintain the accuracy of the note in terms of it's contents,there may have been some errors made inadvertently. Thank you for allowing me to participate in the care of this patient.     Faheem Olmedo, MD.    Cc: Diana Mckeon MD

## 2022-05-24 ENCOUNTER — OFFICE VISIT (OUTPATIENT)
Dept: PAIN MANAGEMENT | Age: 82
End: 2022-05-24
Payer: MEDICARE

## 2022-05-24 VITALS
OXYGEN SATURATION: 98 % | WEIGHT: 194 LBS | BODY MASS INDEX: 39.18 KG/M2 | HEART RATE: 76 BPM | SYSTOLIC BLOOD PRESSURE: 128 MMHG | DIASTOLIC BLOOD PRESSURE: 81 MMHG

## 2022-05-24 DIAGNOSIS — G89.4 CHRONIC PAIN SYNDROME: ICD-10-CM

## 2022-05-24 DIAGNOSIS — M48.56XS NONTRAUMATIC COMPRESSION FRACTURE OF L4 VERTEBRA, SEQUELA: ICD-10-CM

## 2022-05-24 DIAGNOSIS — E11.42 DIABETIC POLYNEUROPATHY ASSOCIATED WITH TYPE 2 DIABETES MELLITUS (HCC): ICD-10-CM

## 2022-05-24 DIAGNOSIS — M51.36 DDD (DEGENERATIVE DISC DISEASE), LUMBAR: ICD-10-CM

## 2022-05-24 PROCEDURE — 99213 OFFICE O/P EST LOW 20 MIN: CPT | Performed by: INTERNAL MEDICINE

## 2022-05-24 PROCEDURE — 1123F ACP DISCUSS/DSCN MKR DOCD: CPT | Performed by: INTERNAL MEDICINE

## 2022-05-24 RX ORDER — BUPRENORPHINE 5 UG/H
1 PATCH TRANSDERMAL WEEKLY
Qty: 4 PATCH | Refills: 1 | Status: SHIPPED | OUTPATIENT
Start: 2022-05-24 | End: 2022-07-20 | Stop reason: SDUPTHER

## 2022-05-24 RX ORDER — GABAPENTIN 300 MG/1
300 CAPSULE ORAL 2 TIMES DAILY
Qty: 60 CAPSULE | Refills: 1 | Status: SHIPPED | OUTPATIENT
Start: 2022-05-24 | End: 2022-07-20 | Stop reason: SDUPTHER

## 2022-05-24 RX ORDER — LIDOCAINE 50 MG/G
PATCH TOPICAL
Qty: 60 PATCH | Refills: 0 | Status: SHIPPED | OUTPATIENT
Start: 2022-05-24 | End: 2022-07-20 | Stop reason: SDUPTHER

## 2022-05-24 NOTE — PROGRESS NOTES
Dolly Neri Re  1940  [id number]      HISTORY OF PRESENT ILLNESS:  Ms. Juan Carlos Sargent is a 78 y.o. female returns for a follow up visit for pain management  She has a diagnosis of   1. Chronic pain syndrome    2. Nontraumatic compression fracture of L5 vertebra, sequela    3. DDD (degenerative disc disease), lumbar    4. Diabetic polyneuropathy associated with type 2 diabetes mellitus ([facility] [address]    . She complains of pain in the Right knee  She rates the pain 3/10 and describes it as aching. Current treatment regimen has helped relieve about 90% of the pain. She denies any side effects from the current pain regimen. Patient reports that since the last follow up visit the physical functioning is unchanged, family/social relationships are unchanged, mood is unchanged sleep patterns are unchanged, and that the overall functioning is unchanged. Patient denies misusing/abusing her narcotic pain medications or using any illegal drugs. There are No indicators for possible drug abuse, addiction or diversion problems. Patient states she has been doing fair, pain has been manageable with the regimen. She says she is using Butrans along with Neurontin. She denies any side effects. She mentions no constipation or cognitive side effects. She complains her back and knees hurt. She reports walking or standing is painful. ALLERGIES: Patients list of allergies were reviewed     MEDICATIONS: Ms. Payne list of medications were reviewed. Her current medications are   Outpatient Medications Prior to Visit   Medication Sig Dispense Refill    lidocaine (LIDODERM) 5 % Apply one patch to skin daily 12 hrs on and 12 hrs off 60 patch 0    dilTIAZem (CARDIZEM CD) 120 MG extended release capsule       verapamil (VERELAN) 240 MG extended release capsule Take 240 mg by mouth nightly      baclofen (LIORESAL) 10 MG tablet Take 10 mg by mouth nightly      acetaminophen (TYLENOL) 500 MG tablet Take 500 mg by mouth every 6 hours as needed for Pain      Calcium Carb-Cholecalciferol 600-100 MG-UNIT CAPS Take by mouth      cetirizine (ZYRTEC) 10 MG tablet TAKE ONE TABLET BY MOUTH EVERY DAY      fenofibrate 160 MG tablet TAKE 1 TABLET EVERY MORNING      ferrous sulfate 325 (65 Fe) MG tablet Take 325 mg by mouth      furosemide (LASIX) 40 MG tablet TAKE 1 TABLET EVERY MORNING      indapamide (LOZOL) 1.25 MG tablet TAKE 1 TABLET DAILY      losartan (COZAAR) 100 MG tablet TAKE 1 TABLET TWICE A DAY      metFORMIN (GLUCOPHAGE) 500 MG tablet 500 mg 2 times daily (with meals)       simvastatin (ZOCOR) 20 MG tablet Take 20 mg by mouth nightly       levothyroxine (SYNTHROID) 75 MCG tablet Take 75 mcg by mouth Daily       gabapentin (NEURONTIN) 300 MG capsule Take 1 capsule by mouth 2 times daily for 30 days. 60 capsule 1     No facility-administered medications prior to visit. REVIEW OF SYSTEMS:    Respiratory: Negative for apnea, chest tightness and shortness of breath or change in baseline breathing. PHYSICAL EXAM:   Nursing note and vitals reviewed. /81   Pulse 76   Wt 194 lb (88 kg)   SpO2 98%   BMI 39.18 kg/m²   Constitutional: She appears well-developed and well-nourished. No acute distress. Cardiovascular: Normal rate, regular rhythm, normal heart sounds, and does not have murmur. Pulmonary/Chest: Effort normal. No respiratory distress. She does not have wheezes in the lung fields. She has no rales. Neurological/Psychiatric:She is alert and oriented to person, place, and time. Coordination is  normal.  Her mood isAppropriate and affect is Neutral/Euthymic(normal) . Other: using a walker   IMPRESSION:   1. Chronic pain syndrome    2. Nontraumatic compression fracture of L5 vertebra, sequela    3. DDD (degenerative disc disease), lumbar    4.  Diabetic polyneuropathy associated with type 2 diabetes mellitus ([facility] [address]        PLAN:  Informed verbal consent was obtained  -[place] record was obtained and reviewed  for the last one year and no indicators of drug misuse  were found. Any other controlled substance prescriptions  seen on the record have been accounted for, I am aware of the patient receiving these medications. Florencio Junaid [place] record will be rechecked as part of office protocol.    -Walking as tolerated   -She was advised to increase fluids ( 5-7  glasses of fluid daily), limit caffeine, avoid cheese products, increase dietary fiber, increase activity and exercise as tolerated and relax regularly and enjoy meals   -Continue with Butrans with Neurontin same dose    Current Outpatient Medications   Medication Sig Dispense Refill    lidocaine (LIDODERM) 5 % Apply one patch to skin daily 12 hrs on and 12 hrs off 60 patch 0    dilTIAZem (CARDIZEM CD) 120 MG extended release capsule       verapamil (VERELAN) 240 MG extended release capsule Take 240 mg by mouth nightly      baclofen (LIORESAL) 10 MG tablet Take 10 mg by mouth nightly      acetaminophen (TYLENOL) 500 MG tablet Take 500 mg by mouth every 6 hours as needed for Pain      Calcium Carb-Cholecalciferol 600-100 MG-UNIT CAPS Take by mouth      cetirizine (ZYRTEC) 10 MG tablet TAKE ONE TABLET BY MOUTH EVERY DAY      fenofibrate 160 MG tablet TAKE 1 TABLET EVERY MORNING      ferrous sulfate 325 (65 Fe) MG tablet Take 325 mg by mouth      furosemide (LASIX) 40 MG tablet TAKE 1 TABLET EVERY MORNING      indapamide (LOZOL) 1.25 MG tablet TAKE 1 TABLET DAILY      losartan (COZAAR) 100 MG tablet TAKE 1 TABLET TWICE A DAY      metFORMIN (GLUCOPHAGE) 500 MG tablet 500 mg 2 times daily (with meals)       simvastatin (ZOCOR) 20 MG tablet Take 20 mg by mouth nightly       levothyroxine (SYNTHROID) 75 MCG tablet Take 75 mcg by mouth Daily       gabapentin (NEURONTIN) 300 MG capsule Take 1 capsule by mouth 2 times daily for 30 days. 60 capsule 1     No current facility-administered medications for this visit.      I will continue her current medication regimen  which is part of the above treatment schedule. It has been helping with Ms. Payne's chronic  medical problems which for this visit include:   Diagnoses of Chronic pain syndrome, Nontraumatic compression fracture of L5 vertebra, sequela, DDD (degenerative disc disease), lumbar, and Diabetic polyneuropathy associated with type 2 diabetes mellitus ([address] were pertinent to this visit. Risks and benefits of the medications and other alternative treatments  including no treatment were discussed with the patient. The common side effects of these medications were also explained to the patient. Informed verbal consent was obtained. Goals of current treatment regimen include improvement in pain, restoration of functioning- with focus on improvement in physical performance, general activity, work or disability,emotional distress, health care utilization and  decreased medication consumption. Will continue to monitor progress towards achieving/maintaining therapeutic goals with special emphasis on  1. Improvement in perceived interfernce  of pain with ADL's. Ability to do home exercises independently. Ability to do household chores indoor and/or outdoor work and social and leisure activities. Improve psychosocial and physical functioning. - she is showing progression towards this treatment goal with the current regimen. She was advised against drinking alcohol with the narcotic pain medicines, advised against driving or handling machinery while adjusting the dose of medicines or if having cognitive  issues related to the current medications. Risk of overdose and death, if medicines not taken as prescribed, were also discussed. If the patient develops new symptoms or if the symptoms worsen, the patient should call the office. While transcribing every attempt was made to maintain the accuracy of the note in terms of it's contents,there may have been some errors made inadvertently.   Thank you for allowing me to participate in the care of this patient.     Jonathan Andersen MD.    Cc: Nasima Unger MD

## 2022-07-20 ENCOUNTER — OFFICE VISIT (OUTPATIENT)
Dept: PAIN MANAGEMENT | Age: 82
End: 2022-07-20
Payer: MEDICARE

## 2022-07-20 VITALS
SYSTOLIC BLOOD PRESSURE: 116 MMHG | HEIGHT: 59 IN | HEART RATE: 93 BPM | OXYGEN SATURATION: 96 % | BODY MASS INDEX: 35.88 KG/M2 | WEIGHT: 178 LBS | DIASTOLIC BLOOD PRESSURE: 68 MMHG

## 2022-07-20 DIAGNOSIS — M51.36 DDD (DEGENERATIVE DISC DISEASE), LUMBAR: ICD-10-CM

## 2022-07-20 DIAGNOSIS — M48.56XS NONTRAUMATIC COMPRESSION FRACTURE OF L4 VERTEBRA, SEQUELA: ICD-10-CM

## 2022-07-20 DIAGNOSIS — E11.42 DIABETIC POLYNEUROPATHY ASSOCIATED WITH TYPE 2 DIABETES MELLITUS (HCC): ICD-10-CM

## 2022-07-20 DIAGNOSIS — G89.4 CHRONIC PAIN SYNDROME: ICD-10-CM

## 2022-07-20 DIAGNOSIS — Z51.81 ENCOUNTER FOR THERAPEUTIC DRUG MONITORING: ICD-10-CM

## 2022-07-20 PROCEDURE — 99213 OFFICE O/P EST LOW 20 MIN: CPT | Performed by: INTERNAL MEDICINE

## 2022-07-20 PROCEDURE — 1123F ACP DISCUSS/DSCN MKR DOCD: CPT | Performed by: INTERNAL MEDICINE

## 2022-07-20 RX ORDER — GABAPENTIN 300 MG/1
300 CAPSULE ORAL 2 TIMES DAILY
Qty: 60 CAPSULE | Refills: 1 | Status: SHIPPED | OUTPATIENT
Start: 2022-07-20 | End: 2022-08-19

## 2022-07-20 RX ORDER — BUPRENORPHINE 5 UG/H
1 PATCH TRANSDERMAL WEEKLY
Qty: 4 PATCH | Refills: 1 | Status: SHIPPED | OUTPATIENT
Start: 2022-07-20 | End: 2022-09-13 | Stop reason: SDUPTHER

## 2022-07-20 RX ORDER — LIDOCAINE 50 MG/G
PATCH TOPICAL
Qty: 60 PATCH | Refills: 1 | Status: SHIPPED | OUTPATIENT
Start: 2022-07-20 | End: 2022-09-13 | Stop reason: SDUPTHER

## 2022-07-20 NOTE — PROGRESS NOTES
Asia Payne  1940  [id number]      HISTORY OF PRESENT ILLNESS:  Ms. Shanda Lambert is a 78 y.o. female returns for a follow up visit for pain management  She has a diagnosis of   1. Encounter for therapeutic drug monitoring    2. Chronic pain syndrome    3. Nontraumatic compression fracture of L5 vertebra, sequela    4. DDD (degenerative disc disease), lumbar    5. Diabetic polyneuropathy associated with type 2 diabetes mellitus ([address]    . She complains of pain in the  Low back, buttock   She rates the pain 4/10 and describes it as sharp, aching, burning. Current treatment regimen has helped relieve about 70% of the pain. She denies any side effects from the current pain regimen. Patient reports that since the last follow up visit the physical functioning is unchanged, family/social relationships are unchanged, mood is unchanged sleep patterns are unchanged, and that the overall functioning is unchanged. Patient denies misusing/abusing her narcotic pain medications or using any illegal drugs. There are No indicators for possible drug abuse, addiction or diversion problems. Patient states she has been doing fair, pain has been manageable. She says she is using Butrans along with Neurontin and Lidoderm as needed. She mentions she has been managing light house chores. She denies any constipation symptoms. She reports her weight has been stable. ALLERGIES: Patients list of allergies were reviewed     MEDICATIONS: Ms. Payne list of medications were reviewed. Her current medications are   Outpatient Medications Prior to Visit   Medication Sig Dispense Refill    dilTIAZem (CARDIZEM CD) 120 MG extended release capsule       verapamil (VERELAN) 240 MG extended release capsule Take 240 mg by mouth nightly      baclofen (LIORESAL) 10 MG tablet Take 10 mg by mouth nightly      acetaminophen (TYLENOL) 500 MG tablet Take 500 mg by mouth every 6 hours as needed for Pain      Calcium Carb-Cholecalciferol 600-100 MG-UNIT CAPS Take by mouth      cetirizine (ZYRTEC) 10 MG tablet TAKE ONE TABLET BY MOUTH EVERY DAY      fenofibrate 160 MG tablet TAKE 1 TABLET EVERY MORNING      ferrous sulfate 325 (65 Fe) MG tablet Take 325 mg by mouth      furosemide (LASIX) 40 MG tablet TAKE 1 TABLET EVERY MORNING      indapamide (LOZOL) 1.25 MG tablet TAKE 1 TABLET DAILY      losartan (COZAAR) 100 MG tablet TAKE 1 TABLET TWICE A DAY      metFORMIN (GLUCOPHAGE) 500 MG tablet 500 mg 2 times daily (with meals)       simvastatin (ZOCOR) 20 MG tablet Take 20 mg by mouth nightly       levothyroxine (SYNTHROID) 75 MCG tablet Take 75 mcg by mouth Daily       gabapentin (NEURONTIN) 300 MG capsule Take 1 capsule by mouth 2 times daily for 30 days. 60 capsule 1    lidocaine (LIDODERM) 5 % Apply one patch to skin daily 12 hrs on and 12 hrs off 60 patch 0     No facility-administered medications prior to visit. REVIEW OF SYSTEMS:    Respiratory: Negative for apnea, chest tightness and shortness of breath or change in baseline breathing. PHYSICAL EXAM:   Nursing note and vitals reviewed. /68   Pulse 93   Ht 4' 11\" (1.499 m)   Wt 178 lb (80.7 kg)   SpO2 96%   BMI 35.95 kg/m²   Constitutional: She appears well-developed and well-nourished. No acute distress. Cardiovascular: Normal rate, regular rhythm, normal heart sounds, and does not have murmur. Pulmonary/Chest: Effort normal. No respiratory distress. She does not have wheezes in the lung fields. She has no rales. Neurological/Psychiatric:She is alert and oriented to person, place, and time. Coordination is  normal.  Her mood isAppropriate and affect is Neutral/Euthymic(normal) . Other: using a walker     IMPRESSION:   1. Chronic pain syndrome    2. Nontraumatic compression fracture of L5 vertebra, sequela    3. DDD (degenerative disc disease), lumbar    4. Diabetic polyneuropathy associated with type 2 diabetes mellitus ([facility] [address]    5.  Encounter for therapeutic drug monitoring PLAN:  Informed verbal consent was obtained  -[place] record was obtained and reviewed  for the last one year and no indicators of drug misuse  were found. Any other controlled substance prescriptions  seen on the record have been accounted for, I am aware of the patient receiving these medications. Jorge Fournier OAR[place] record will be rechecked as part of office protocol. -ROM/Stretching exercises as advised   -Continue with Butrans 5 mcg every 7 days   -She was advised to increase fluids ( 5-7  glasses of fluid daily), limit caffeine, avoid cheese products, increase dietary fiber, increase activity and exercise as tolerated and relax regularly and enjoy meals   -Urine drug screen with GC/MS for opiates and drugs of abuse was ordered and will follow up on results. -Walking/stretching exercises as advised    Current Outpatient Medications   Medication Sig Dispense Refill    lidocaine (LIDODERM) 5 % Apply one patch to skin daily 12 hrs on and 12 hrs off 60 patch 1    gabapentin (NEURONTIN) 300 MG capsule Take 1 capsule by mouth in the morning and 1 capsule before bedtime. Do all this for 30 days. 60 capsule 1    buprenorphine (BUTRANS) 5 MCG/HR PTWK Place 1 patch onto the skin once a week for 28 days.  4 patch 1    dilTIAZem (CARDIZEM CD) 120 MG extended release capsule       verapamil (VERELAN) 240 MG extended release capsule Take 240 mg by mouth nightly      baclofen (LIORESAL) 10 MG tablet Take 10 mg by mouth nightly      acetaminophen (TYLENOL) 500 MG tablet Take 500 mg by mouth every 6 hours as needed for Pain      Calcium Carb-Cholecalciferol 600-100 MG-UNIT CAPS Take by mouth      cetirizine (ZYRTEC) 10 MG tablet TAKE ONE TABLET BY MOUTH EVERY DAY      fenofibrate 160 MG tablet TAKE 1 TABLET EVERY MORNING      ferrous sulfate 325 (65 Fe) MG tablet Take 325 mg by mouth      furosemide (LASIX) 40 MG tablet TAKE 1 TABLET EVERY MORNING      indapamide (LOZOL) 1.25 MG tablet TAKE 1 TABLET DAILY      losartan (COZAAR) 100 MG tablet TAKE 1 TABLET TWICE A DAY      metFORMIN (GLUCOPHAGE) 500 MG tablet 500 mg 2 times daily (with meals)       simvastatin (ZOCOR) 20 MG tablet Take 20 mg by mouth nightly       levothyroxine (SYNTHROID) 75 MCG tablet Take 75 mcg by mouth Daily        No current facility-administered medications for this visit. I will continue her current medication regimen  which is part of the above treatment schedule. It has been helping with Ms. Payne's chronic  medical problems which for this visit include: The primary encounter diagnosis was Encounter for therapeutic drug monitoring. Diagnoses of Chronic pain syndrome, Nontraumatic compression fracture of L5 vertebra, sequela, DDD (degenerative disc disease), lumbar, and Diabetic polyneuropathy associated with type 2 diabetes mellitus ([facility] [address] were also pertinent to this visit. Risks and benefits of the medications and other alternative treatments  including no treatment were discussed with the patient. The common side effects of these medications were also explained to the patient. Informed verbal consent was obtained. Goals of current treatment regimen include improvement in pain, restoration of functioning- with focus on improvement in physical performance, general activity, work or disability,emotional distress, health care utilization and  decreased medication consumption. Will continue to monitor progress towards achieving/maintaining therapeutic goals with special emphasis on  1. Improvement in perceived interfernce  of pain with ADL's. Ability to do home exercises independently. Ability to do household chores indoor and/or outdoor work and social and leisure activities. Improve psychosocial and physical functioning. - she is showing progression towards this treatment goal with the current regimen.      She was advised against drinking alcohol with the narcotic pain medicines, advised against driving or handling machinery while adjusting the dose of medicines or if having cognitive  issues related to the current medications. Risk of overdose and death, if medicines not taken as prescribed, were also discussed. If the patient develops new symptoms or if the symptoms worsen, the patient should call the office. While transcribing every attempt was made to maintain the accuracy of the note in terms of it's contents,there may have been some errors made inadvertently. Thank you for allowing me to participate in the care of this patient.     Romayne Amsterdam, MD.    Cc: Yolanda Zarate MD

## 2022-09-13 ENCOUNTER — OFFICE VISIT (OUTPATIENT)
Dept: PAIN MANAGEMENT | Age: 82
End: 2022-09-13
Payer: MEDICARE

## 2022-09-13 ENCOUNTER — TELEPHONE (OUTPATIENT)
Dept: PAIN MANAGEMENT | Age: 82
End: 2022-09-13

## 2022-09-13 VITALS
HEART RATE: 94 BPM | OXYGEN SATURATION: 96 % | SYSTOLIC BLOOD PRESSURE: 129 MMHG | BODY MASS INDEX: 36.08 KG/M2 | HEIGHT: 59 IN | DIASTOLIC BLOOD PRESSURE: 77 MMHG | WEIGHT: 179 LBS

## 2022-09-13 DIAGNOSIS — M48.56XS NONTRAUMATIC COMPRESSION FRACTURE OF L4 VERTEBRA, SEQUELA: ICD-10-CM

## 2022-09-13 DIAGNOSIS — M51.36 DDD (DEGENERATIVE DISC DISEASE), LUMBAR: ICD-10-CM

## 2022-09-13 DIAGNOSIS — G89.4 CHRONIC PAIN SYNDROME: ICD-10-CM

## 2022-09-13 DIAGNOSIS — E11.42 DIABETIC POLYNEUROPATHY ASSOCIATED WITH TYPE 2 DIABETES MELLITUS (HCC): ICD-10-CM

## 2022-09-13 PROCEDURE — 1123F ACP DISCUSS/DSCN MKR DOCD: CPT | Performed by: INTERNAL MEDICINE

## 2022-09-13 PROCEDURE — 99213 OFFICE O/P EST LOW 20 MIN: CPT | Performed by: INTERNAL MEDICINE

## 2022-09-13 RX ORDER — BUPRENORPHINE 5 UG/H
1 PATCH TRANSDERMAL WEEKLY
Qty: 4 PATCH | Refills: 1 | Status: SHIPPED | OUTPATIENT
Start: 2022-09-13 | End: 2022-10-11

## 2022-09-13 RX ORDER — LIDOCAINE 50 MG/G
PATCH TOPICAL
Qty: 60 PATCH | Refills: 1 | Status: SHIPPED | OUTPATIENT
Start: 2022-09-13

## 2022-09-13 NOTE — TELEPHONE ENCOUNTER
Patients BUTRANS were sent [address] but need to [place].          [facility] [address], [address] [facility]

## 2022-09-13 NOTE — PROGRESS NOTES
TABLET BY MOUTH EVERY DAY      fenofibrate 160 MG tablet TAKE 1 TABLET EVERY MORNING      ferrous sulfate 325 (65 Fe) MG tablet Take 325 mg by mouth      furosemide (LASIX) 40 MG tablet TAKE 1 TABLET EVERY MORNING      indapamide (LOZOL) 1.25 MG tablet TAKE 1 TABLET DAILY      losartan (COZAAR) 100 MG tablet TAKE 1 TABLET TWICE A DAY      metFORMIN (GLUCOPHAGE) 500 MG tablet 500 mg 2 times daily (with meals)       simvastatin (ZOCOR) 20 MG tablet Take 20 mg by mouth nightly       levothyroxine (SYNTHROID) 75 MCG tablet Take 75 mcg by mouth Daily       gabapentin (NEURONTIN) 300 MG capsule Take 1 capsule by mouth in the morning and 1 capsule before bedtime. Do all this for 30 days. 60 capsule 1    lidocaine (LIDODERM) 5 % Apply one patch to skin daily 12 hrs on and 12 hrs off 60 patch 1     No facility-administered medications prior to visit. REVIEW OF SYSTEMS:    Respiratory: Negative for apnea, chest tightness and shortness of breath or change in baseline breathing. PHYSICAL EXAM:   Nursing note and vitals reviewed. /77 (Site: Left Upper Arm, Position: Sitting)   Pulse 94   Ht 4' 11\" (1.499 m)   Wt 179 lb (81.2 kg)   SpO2 96%   BMI 36.15 kg/m²   Constitutional: She appears well-developed and well-nourished. No acute distress. Cardiovascular: Normal rate, regular rhythm, normal heart sounds, and does not have murmur. Pulmonary/Chest: Effort normal. No respiratory distress. She does not have wheezes in the lung fields. She has no rales. Neurological/Psychiatric:She is alert and oriented to person, place, and time. Coordination is  normal.  Her mood isAppropriate and affect is Neutral/Euthymic(normal) . Her  Other: using a walker  IMPRESSION:   1. Chronic pain syndrome    2. Nontraumatic compression fracture of L5 vertebra, sequela    3. DDD (degenerative disc disease), lumbar    4.  Diabetic polyneuropathy associated with type 2 diabetes mellitus ([facility]        PLAN:  Informed verbal consent was obtained  -[place]AR[place] record was obtained and reviewed  for the last one year and no indicators of drug misuse  were found. Any other controlled substance prescriptions  seen on the record have been accounted for, I am aware of the patient receiving these medications. Alisa Carlos OAR[place] record will be rechecked as part of office protocol. -ROM/Stretching exercises as advised   -Continue with Butrans 5 mcg Q 7 days   -Walking and Stretching exercises as advised    -She was advised to increase fluids ( 5-7  glasses of fluid daily), limit caffeine, avoid cheese products, increase dietary fiber, increase activity and exercise as tolerated and relax regularly and enjoy meals   -Patient's urine drug screen results with GC/MS confirmation were obtained and reviewed and were negative for any illicit drugs. Prescribed medications were within acceptable range. Current Outpatient Medications   Medication Sig Dispense Refill    lidocaine (LIDODERM) 5 % Apply one patch to skin daily 12 hrs on and 12 hrs off 60 patch 1    buprenorphine (BUTRANS) 5 MCG/HR PTWK Place 1 patch onto the skin once a week for 28 days.  4 patch 1    dilTIAZem (CARDIZEM CD) 120 MG extended release capsule       verapamil (VERELAN) 240 MG extended release capsule Take 240 mg by mouth nightly      baclofen (LIORESAL) 10 MG tablet Take 10 mg by mouth nightly      acetaminophen (TYLENOL) 500 MG tablet Take 500 mg by mouth every 6 hours as needed for Pain      Calcium Carb-Cholecalciferol 600-100 MG-UNIT CAPS Take by mouth      cetirizine (ZYRTEC) 10 MG tablet TAKE ONE TABLET BY MOUTH EVERY DAY      fenofibrate 160 MG tablet TAKE 1 TABLET EVERY MORNING      ferrous sulfate 325 (65 Fe) MG tablet Take 325 mg by mouth      furosemide (LASIX) 40 MG tablet TAKE 1 TABLET EVERY MORNING      indapamide (LOZOL) 1.25 MG tablet TAKE 1 TABLET DAILY      losartan (COZAAR) 100 MG tablet TAKE 1 TABLET TWICE A DAY      metFORMIN (GLUCOPHAGE) 500 MG tablet 500 mg 2 times daily (with meals)       simvastatin (ZOCOR) 20 MG tablet Take 20 mg by mouth nightly       levothyroxine (SYNTHROID) 75 MCG tablet Take 75 mcg by mouth Daily       gabapentin (NEURONTIN) 300 MG capsule Take 1 capsule by mouth in the morning and 1 capsule before bedtime. Do all this for 30 days. 60 capsule 1     No current facility-administered medications for this visit. I will continue her current medication regimen  which is part of the above treatment schedule. It has been helping with Ms. Payne's chronic  medical problems which for this visit include:   Diagnoses of Chronic pain syndrome, Nontraumatic compression fracture of L5 vertebra, sequela, DDD (degenerative disc disease), lumbar, and Diabetic polyneuropathy associated with type 2 diabetes mellitus ([facility] [address] were pertinent to this visit. Risks and benefits of the medications and other alternative treatments  including no treatment were discussed with the patient. The common side effects of these medications were also explained to the patient. Informed verbal consent was obtained. Goals of current treatment regimen include improvement in pain, restoration of functioning- with focus on improvement in physical performance, general activity, work or disability,emotional distress, health care utilization and  decreased medication consumption. Will continue to monitor progress towards achieving/maintaining therapeutic goals with special emphasis on  1. Improvement in perceived interfernce  of pain with ADL's. Ability to do home exercises independently. Ability to do household chores indoor and/or outdoor work and social and leisure activities. Improve psychosocial and physical functioning. - she is showing progression towards this treatment goal with the current regimen.      She was advised against drinking alcohol with the narcotic pain medicines, advised against driving or handling machinery while adjusting the dose of medicines or if having cognitive  issues related to the current medications. Risk of overdose and death, if medicines not taken as prescribed, were also discussed. If the patient develops new symptoms or if the symptoms worsen, the patient should call the office. While transcribing every attempt was made to maintain the accuracy of the note in terms of it's contents,there may have been some errors made inadvertently. Thank you for allowing me to participate in the care of this patient.     Christ Robin MD.    Cc: Marika Dotson MD

## 2022-09-14 ENCOUNTER — TELEPHONE (OUTPATIENT)
Dept: ADMINISTRATIVE | Age: 82
End: 2022-09-14

## 2022-10-11 ENCOUNTER — TELEPHONE (OUTPATIENT)
Dept: PAIN MANAGEMENT | Age: 82
End: 2022-10-11

## 2022-10-11 DIAGNOSIS — M51.36 DDD (DEGENERATIVE DISC DISEASE), LUMBAR: ICD-10-CM

## 2022-10-11 DIAGNOSIS — M48.56XS NONTRAUMATIC COMPRESSION FRACTURE OF L4 VERTEBRA, SEQUELA: ICD-10-CM

## 2022-10-11 DIAGNOSIS — E11.42 DIABETIC POLYNEUROPATHY ASSOCIATED WITH TYPE 2 DIABETES MELLITUS (HCC): ICD-10-CM

## 2022-10-11 DIAGNOSIS — G89.4 CHRONIC PAIN SYNDROME: ICD-10-CM

## 2022-10-11 NOTE — TELEPHONE ENCOUNTER
Submitted PA for Buprenorphine 5MCG/HR weekly patches, Key: [id number]. Status:Approved; Review Type:Prior Auth; Coverage Start Date:09/11/2022; Coverage End Date:10/11/2023. Patient notified via voicemail.

## 2022-10-11 NOTE — TELEPHONE ENCOUNTER
Patient's butrans  and she needs these     [address] [facility] [facility] [phone]

## 2022-10-26 RX ORDER — BUPRENORPHINE 5 UG/H
PATCH TRANSDERMAL
Qty: 4 PATCH | Refills: 1 | OUTPATIENT
Start: 2022-10-26

## 2022-11-09 ENCOUNTER — OFFICE VISIT (OUTPATIENT)
Dept: PAIN MANAGEMENT | Age: 82
End: 2022-11-09
Payer: MEDICARE

## 2022-11-09 VITALS
DIASTOLIC BLOOD PRESSURE: 80 MMHG | HEIGHT: 59 IN | BODY MASS INDEX: 34.88 KG/M2 | WEIGHT: 173 LBS | SYSTOLIC BLOOD PRESSURE: 132 MMHG | HEART RATE: 100 BPM | TEMPERATURE: 96.9 F | OXYGEN SATURATION: 98 %

## 2022-11-09 DIAGNOSIS — M48.56XS NONTRAUMATIC COMPRESSION FRACTURE OF L4 VERTEBRA, SEQUELA: ICD-10-CM

## 2022-11-09 DIAGNOSIS — M51.36 DDD (DEGENERATIVE DISC DISEASE), LUMBAR: ICD-10-CM

## 2022-11-09 DIAGNOSIS — G89.4 CHRONIC PAIN SYNDROME: ICD-10-CM

## 2022-11-09 DIAGNOSIS — E11.42 DIABETIC POLYNEUROPATHY ASSOCIATED WITH TYPE 2 DIABETES MELLITUS (HCC): ICD-10-CM

## 2022-11-09 PROCEDURE — 1123F ACP DISCUSS/DSCN MKR DOCD: CPT | Performed by: INTERNAL MEDICINE

## 2022-11-09 PROCEDURE — 99213 OFFICE O/P EST LOW 20 MIN: CPT | Performed by: INTERNAL MEDICINE

## 2022-11-09 RX ORDER — BUPRENORPHINE 5 UG/H
1 PATCH TRANSDERMAL WEEKLY
Qty: 4 PATCH | Refills: 1 | Status: SHIPPED | OUTPATIENT
Start: 2022-11-09 | End: 2022-12-07

## 2022-11-09 NOTE — PROGRESS NOTES
Daniella Elliott Re  1940  [id number]      HISTORY OF PRESENT ILLNESS:  Ms. Deepak Tabares is a 78 y.o. female returns for a follow up visit for pain management  She has a diagnosis of   1. Chronic pain syndrome    2. Nontraumatic compression fracture of L5 vertebra, sequela    3. DDD (degenerative disc disease), lumbar    4. Diabetic polyneuropathy associated with type 2 diabetes mellitus ([facility] [address]    . She complains of pain in the  lower back  She rates the pain 4/10 and describes it as aching, burning. Current treatment regimen has helped relieve about 90% of the pain. She denies any side effects from the current pain regimen. Patient reports that since the last follow up visit the physical functioning is unchanged, family/social relationships are unchanged, mood is better sleep patterns are unchanged, and that the overall functioning is unchanged. Patient denies misusing/abusing her narcotic pain medications or using any illegal drugs. There are No indicators for possible drug abuse, addiction or diversion problems, patient states she has been managing okay with the medications. Patient says the pain is greater in the back than the legs. Ms. Payne states walking or standing is painful. She mentions she is using Butrans along with Neurontin. Patient denies any constipation symptoms or cognitive side effects. ALLERGIES: Patients list of allergies were reviewed     MEDICATIONS: Ms. Payne list of medications were reviewed. Her current medications are   Outpatient Medications Prior to Visit   Medication Sig Dispense Refill    lidocaine (LIDODERM) 5 % Apply one patch to skin daily 12 hrs on and 12 hrs off 60 patch 1    dilTIAZem (CARDIZEM CD) 120 MG extended release capsule       verapamil (VERELAN) 240 MG extended release capsule Take 240 mg by mouth nightly      baclofen (LIORESAL) 10 MG tablet Take 10 mg by mouth nightly      acetaminophen (TYLENOL) 500 MG tablet Take 500 mg by mouth every 6 hours as needed for Pain      Calcium Carb-Cholecalciferol 600-100 MG-UNIT CAPS Take by mouth      cetirizine (ZYRTEC) 10 MG tablet TAKE ONE TABLET BY MOUTH EVERY DAY      fenofibrate 160 MG tablet TAKE 1 TABLET EVERY MORNING      ferrous sulfate 325 (65 Fe) MG tablet Take 325 mg by mouth      furosemide (LASIX) 40 MG tablet TAKE 1 TABLET EVERY MORNING      indapamide (LOZOL) 1.25 MG tablet TAKE 1 TABLET DAILY      losartan (COZAAR) 100 MG tablet TAKE 1 TABLET TWICE A DAY      metFORMIN (GLUCOPHAGE) 500 MG tablet 500 mg 2 times daily (with meals)       simvastatin (ZOCOR) 20 MG tablet Take 20 mg by mouth nightly       levothyroxine (SYNTHROID) 75 MCG tablet Take 75 mcg by mouth Daily       gabapentin (NEURONTIN) 300 MG capsule Take 1 capsule by mouth in the morning and 1 capsule before bedtime. Do all this for 30 days. 60 capsule 1     No facility-administered medications prior to visit. REVIEW OF SYSTEMS:    Respiratory: Negative for apnea, chest tightness and shortness of breath or change in baseline breathing. PHYSICAL EXAM:   Nursing note and vitals reviewed. /80 (Site: Right Upper Arm)   Pulse 100   Temp 96.9 °F (36.1 °C) (Temporal)   Ht 4' 11\" (1.499 m)   Wt 173 lb (78.5 kg)   SpO2 98%   BMI 34.94 kg/m²   Constitutional: She appears well-developed and well-nourished. No acute distress. Cardiovascular: Normal rate, regular rhythm, normal heart sounds, and does not have murmur. Pulmonary/Chest: Effort normal. No respiratory distress. She does not have wheezes in the lung fields. She has no rales. Neurological/Psychiatric:She is alert and oriented to person, place, and time. Coordination is  normal.  Her mood isAppropriate and affect is Neutral/Euthymic(normal) . Her  Other: using a walker. Slow gait  IMPRESSION:   1. Chronic pain syndrome    2. Nontraumatic compression fracture of L5 vertebra, sequela    3. DDD (degenerative disc disease), lumbar    4.  Diabetic polyneuropathy associated with type 2 diabetes mellitus [facility])        PLAN:  Informed verbal consent was obtained  -[place] record was obtained and reviewed  for the last one year and no indicators of drug misuse  were found. Any other controlled substance prescriptions  seen on the record have been accounted for, I am aware of the patient receiving these medications. Stella Arambula O[place] record will be rechecked as part of office protocol. -ROM/Stretching exercises as advised   -She was advised to increase fluids ( 5-7  glasses of fluid daily), limit caffeine, avoid cheese products, increase dietary fiber, increase activity and exercise as tolerated and relax regularly and enjoy meals   -Continue with Butrans 5 mcg Q 7 days    Current Outpatient Medications   Medication Sig Dispense Refill    lidocaine (LIDODERM) 5 % Apply one patch to skin daily 12 hrs on and 12 hrs off 60 patch 1    dilTIAZem (CARDIZEM CD) 120 MG extended release capsule       verapamil (VERELAN) 240 MG extended release capsule Take 240 mg by mouth nightly      baclofen (LIORESAL) 10 MG tablet Take 10 mg by mouth nightly      acetaminophen (TYLENOL) 500 MG tablet Take 500 mg by mouth every 6 hours as needed for Pain      Calcium Carb-Cholecalciferol 600-100 MG-UNIT CAPS Take by mouth      cetirizine (ZYRTEC) 10 MG tablet TAKE ONE TABLET BY MOUTH EVERY DAY      fenofibrate 160 MG tablet TAKE 1 TABLET EVERY MORNING      ferrous sulfate 325 (65 Fe) MG tablet Take 325 mg by mouth      furosemide (LASIX) 40 MG tablet TAKE 1 TABLET EVERY MORNING      indapamide (LOZOL) 1.25 MG tablet TAKE 1 TABLET DAILY      losartan (COZAAR) 100 MG tablet TAKE 1 TABLET TWICE A DAY      metFORMIN (GLUCOPHAGE) 500 MG tablet 500 mg 2 times daily (with meals)       simvastatin (ZOCOR) 20 MG tablet Take 20 mg by mouth nightly       levothyroxine (SYNTHROID) 75 MCG tablet Take 75 mcg by mouth Daily       gabapentin (NEURONTIN) 300 MG capsule Take 1 capsule by mouth in the morning and 1 capsule before bedtime. Do all this for 30 days.  61 capsule 1     No current facility-administered medications for this visit. I will continue her current medication regimen  which is part of the above treatment schedule. It has been helping with Ms. Payne's chronic  medical problems which for this visit include:   Diagnoses of Chronic pain syndrome, Nontraumatic compression fracture of L5 vertebra, sequela, DDD (degenerative disc disease), lumbar, and Diabetic polyneuropathy associated with type 2 diabetes mellitus ([address] were pertinent to this visit. Risks and benefits of the medications and other alternative treatments  including no treatment were discussed with the patient. The common side effects of these medications were also explained to the patient. Informed verbal consent was obtained. Goals of current treatment regimen include improvement in pain, restoration of functioning- with focus on improvement in physical performance, general activity, work or disability,emotional distress, health care utilization and  decreased medication consumption. Will continue to monitor progress towards achieving/maintaining therapeutic goals with special emphasis on  1. Improvement in perceived interfernce  of pain with ADL's. Ability to do home exercises independently. Ability to do household chores indoor and/or outdoor work and social and leisure activities. Improve psychosocial and physical functioning. - she is showing progression towards this treatment goal with the current regimen. She was advised against drinking alcohol with the narcotic pain medicines, advised against driving or handling machinery while adjusting the dose of medicines or if having cognitive  issues related to the current medications. Risk of overdose and death, if medicines not taken as prescribed, were also discussed. If the patient develops new symptoms or if the symptoms worsen, the patient should call the office.     While transcribing every attempt was made to maintain the accuracy of the note in terms of it's contents,there may have been some errors made inadvertently. Thank you for allowing me to participate in the care of this patient.     Norma Dang MD.    Cc: Daniel Maldonado MD

## 2023-01-04 ENCOUNTER — OFFICE VISIT (OUTPATIENT)
Dept: PAIN MANAGEMENT | Age: 83
End: 2023-01-04
Payer: MEDICARE

## 2023-01-04 VITALS
SYSTOLIC BLOOD PRESSURE: 106 MMHG | HEART RATE: 82 BPM | BODY MASS INDEX: 37.16 KG/M2 | DIASTOLIC BLOOD PRESSURE: 70 MMHG | WEIGHT: 184 LBS

## 2023-01-04 DIAGNOSIS — M51.36 DDD (DEGENERATIVE DISC DISEASE), LUMBAR: ICD-10-CM

## 2023-01-04 DIAGNOSIS — G89.4 CHRONIC PAIN SYNDROME: ICD-10-CM

## 2023-01-04 DIAGNOSIS — E11.42 DIABETIC POLYNEUROPATHY ASSOCIATED WITH TYPE 2 DIABETES MELLITUS (HCC): ICD-10-CM

## 2023-01-04 DIAGNOSIS — M48.56XS NONTRAUMATIC COMPRESSION FRACTURE OF L4 VERTEBRA, SEQUELA: ICD-10-CM

## 2023-01-04 PROCEDURE — 1123F ACP DISCUSS/DSCN MKR DOCD: CPT | Performed by: INTERNAL MEDICINE

## 2023-01-04 PROCEDURE — 99213 OFFICE O/P EST LOW 20 MIN: CPT | Performed by: INTERNAL MEDICINE

## 2023-01-04 RX ORDER — BUPRENORPHINE 5 UG/H
1 PATCH TRANSDERMAL WEEKLY
Qty: 4 PATCH | Refills: 1 | Status: SHIPPED | OUTPATIENT
Start: 2023-01-04 | End: 2023-02-01

## 2023-01-04 NOTE — PROGRESS NOTES
Lidia Hall Re  1940  [id number]      HISTORY OF PRESENT ILLNESS:  Ms. Khanh Davidson is a 78 y.o. female returns for a follow up visit for pain management  She has a diagnosis of   1. Chronic pain syndrome    2. Nontraumatic compression fracture of L5 vertebra, sequela    3. DDD (degenerative disc disease), lumbar    4. Diabetic polyneuropathy associated with type 2 diabetes mellitus ([facility] [address]    . She complains of pain in the  lower back  She rates the pain 4/10 and describes it as aching. Current treatment regimen has helped relieve about 80% of the pain. She denies any side effects from the current pain regimen. Patient reports that since the last follow up visit the physical functioning is unchanged, family/social relationships are unchanged, mood is unchanged sleep patterns are unchanged, and that the overall functioning is unchanged. Patient denies misusing/abusing her narcotic pain medications or using any illegal drugs. There are No indicators for possible drug abuse, addiction or diversion problems. Patient states she has been doing fair, she states the [employer] is helping with the pain, she denies any side effects with it. Ms. Payne states she is using a walker for ambulation. Patient denies any constipation symptoms. Patient says the pain is greater in the back than the legs. Patient reports her blood sugar has been under controlled. ALLERGIES: Patients list of allergies were reviewed     MEDICATIONS: Ms. Payne list of medications were reviewed. Her current medications are   Outpatient Medications Prior to Visit   Medication Sig Dispense Refill    lidocaine (LIDODERM) 5 % Apply one patch to skin daily 12 hrs on and 12 hrs off 60 patch 1    dilTIAZem (CARDIZEM CD) 120 MG extended release capsule       verapamil (VERELAN) 240 MG extended release capsule Take 240 mg by mouth nightly      baclofen (LIORESAL) 10 MG tablet Take 10 mg by mouth nightly      acetaminophen (TYLENOL) 500 MG tablet Take 500 mg by mouth every 6 hours as needed for Pain      Calcium Carb-Cholecalciferol 600-100 MG-UNIT CAPS Take by mouth      cetirizine (ZYRTEC) 10 MG tablet TAKE ONE TABLET BY MOUTH EVERY DAY      fenofibrate 160 MG tablet TAKE 1 TABLET EVERY MORNING      ferrous sulfate 325 (65 Fe) MG tablet Take 325 mg by mouth      furosemide (LASIX) 40 MG tablet TAKE 1 TABLET EVERY MORNING      indapamide (LOZOL) 1.25 MG tablet TAKE 1 TABLET DAILY      losartan (COZAAR) 100 MG tablet TAKE 1 TABLET TWICE A DAY      metFORMIN (GLUCOPHAGE) 500 MG tablet 500 mg 2 times daily (with meals)       simvastatin (ZOCOR) 20 MG tablet Take 20 mg by mouth nightly       levothyroxine (SYNTHROID) 75 MCG tablet Take 75 mcg by mouth Daily       gabapentin (NEURONTIN) 300 MG capsule Take 1 capsule by mouth in the morning and 1 capsule before bedtime. Do all this for 30 days. 60 capsule 1     No facility-administered medications prior to visit. REVIEW OF SYSTEMS:    Respiratory: Negative for apnea, chest tightness and shortness of breath or change in baseline breathing. PHYSICAL EXAM:   Nursing note and vitals reviewed. /70   Pulse 82   Wt 184 lb (83.5 kg)   BMI 37.16 kg/m²   Constitutional: She appears well-developed and well-nourished. No acute distress. Cardiovascular: Normal rate, regular rhythm, normal heart sounds, and does not have murmur. Pulmonary/Chest: Effort normal. No respiratory distress. She does not have wheezes in the lung fields. She has no rales. +crepts at base  Neurological/Psychiatric:She is alert and oriented to person, place, and time. Coordination is  normal.  Her mood isAppropriate and affect is Neutral/Euthymic(normal) . Her    IMPRESSION:   1. Chronic pain syndrome    2. Nontraumatic compression fracture of L5 vertebra, sequela    3. DDD (degenerative disc disease), lumbar    4.  Diabetic polyneuropathy associated with type 2 diabetes mellitus ([facility] [address]        PLAN:  Informed verbal consent was obtained  -[place] record was obtained and reviewed  for the last one year and no indicators of drug misuse  were found. Any other controlled substance prescriptions  seen on the record have been accounted for, I am aware of the patient receiving these medications. [place] record will be rechecked as part of office protocol. -ROM/Stretching exercises as advised   -ROM/Stretching exercises as advised   -Continue with Butrans 5 mcg Q 7 days   -She was advised to increase fluids ( 5-7  glasses of fluid daily), limit caffeine, avoid cheese products, increase dietary fiber, increase activity and exercise as tolerated and relax regularly and enjoy meals   -Pulmonary Rehab exercises given     Current Outpatient Medications   Medication Sig Dispense Refill    lidocaine (LIDODERM) 5 % Apply one patch to skin daily 12 hrs on and 12 hrs off 60 patch 1    dilTIAZem (CARDIZEM CD) 120 MG extended release capsule       verapamil (VERELAN) 240 MG extended release capsule Take 240 mg by mouth nightly      baclofen (LIORESAL) 10 MG tablet Take 10 mg by mouth nightly      acetaminophen (TYLENOL) 500 MG tablet Take 500 mg by mouth every 6 hours as needed for Pain      Calcium Carb-Cholecalciferol 600-100 MG-UNIT CAPS Take by mouth      cetirizine (ZYRTEC) 10 MG tablet TAKE ONE TABLET BY MOUTH EVERY DAY      fenofibrate 160 MG tablet TAKE 1 TABLET EVERY MORNING      ferrous sulfate 325 (65 Fe) MG tablet Take 325 mg by mouth      furosemide (LASIX) 40 MG tablet TAKE 1 TABLET EVERY MORNING      indapamide (LOZOL) 1.25 MG tablet TAKE 1 TABLET DAILY      losartan (COZAAR) 100 MG tablet TAKE 1 TABLET TWICE A DAY      metFORMIN (GLUCOPHAGE) 500 MG tablet 500 mg 2 times daily (with meals)       simvastatin (ZOCOR) 20 MG tablet Take 20 mg by mouth nightly       levothyroxine (SYNTHROID) 75 MCG tablet Take 75 mcg by mouth Daily       gabapentin (NEURONTIN) 300 MG capsule Take 1 capsule by mouth in the morning and 1 capsule before bedtime. Do all this for 30 days.  61 capsule 1     No current facility-administered medications for this visit. I will continue her current medication regimen  which is part of the above treatment schedule. It has been helping with Ms. Payne's chronic  medical problems which for this visit include:   Diagnoses of Chronic pain syndrome, Nontraumatic compression fracture of L5 vertebra, sequela, DDD (degenerative disc disease), lumbar, and Diabetic polyneuropathy associated with type 2 diabetes mellitus ([address] were pertinent to this visit. Risks and benefits of the medications and other alternative treatments  including no treatment were discussed with the patient. The common side effects of these medications were also explained to the patient. Informed verbal consent was obtained. Goals of current treatment regimen include improvement in pain, restoration of functioning- with focus on improvement in physical performance, general activity, work or disability,emotional distress, health care utilization and  decreased medication consumption. Will continue to monitor progress towards achieving/maintaining therapeutic goals with special emphasis on  1. Improvement in perceived interfernce  of pain with ADL's. Ability to do home exercises independently. Ability to do household chores indoor and/or outdoor work and social and leisure activities. Improve psychosocial and physical functioning. - she is showing progression towards this treatment goal with the current regimen. She was advised against drinking alcohol with the narcotic pain medicines, advised against driving or handling machinery while adjusting the dose of medicines or if having cognitive  issues related to the current medications. Risk of overdose and death, if medicines not taken as prescribed, were also discussed. If the patient develops new symptoms or if the symptoms worsen, the patient should call the office.     While transcribing every attempt was made to maintain the accuracy of the note in terms of it's contents,there may have been some errors made inadvertently. Thank you for allowing me to participate in the care of this patient.     Deepika Thurman MD.    Cc: Kell Gore MD

## 2023-02-15 RX ORDER — METOPROLOL SUCCINATE 25 MG/1
25 TABLET, EXTENDED RELEASE ORAL DAILY
COMMUNITY

## 2023-02-15 RX ORDER — BUPRENORPHINE 5 UG/H
PATCH TRANSDERMAL
COMMUNITY
Start: 2022-04-25

## 2023-02-15 RX ORDER — POTASSIUM CHLORIDE 750 MG/1
10 CAPSULE, EXTENDED RELEASE ORAL 2 TIMES DAILY
COMMUNITY

## 2023-02-15 RX ORDER — FENOFIBRATE 160 MG/1
160 TABLET ORAL DAILY
COMMUNITY

## 2023-02-15 RX ORDER — ATORVASTATIN CALCIUM 40 MG/1
40 TABLET, FILM COATED ORAL DAILY
COMMUNITY

## 2023-02-15 NOTE — PROGRESS NOTES
DOS    36    Patient to call Dr Almita Ortega for instructions on [facility] , has pre-op on  @ 21  with Dr Mckinley [phone] and if cardiac ok needed if blood thinner needs to be stopped she will call Dr Jose De Jesus Marc [phone] for ok. Both to be faxed to PAT. No orders at time of interview , office called message left on answering machine. UPDATE:Spoke with patient, she states was told to hold eliquis for 5 days prior to surgery , she states Dr Mckinley told her it was okay to hold nothing noted at office visit. Called his office spoke with Catracho Shields , she will has him do an addendum and fax to PAT. Await fax. UPDATE: okay to hold eliquis scanned into [employer].

## 2023-02-15 NOTE — PROGRESS NOTES
[facility] [facility] time__1000__________        Surgery time__1200__________    Take the following medications with a sip of water: Follow your MD/Surgeons pre-procedure instructions regarding your medications     Do not eat or drink anything after 12:00 midnight prior to your surgery. This includes water chewing gum, mints and ice chips. You may brush your teeth and gargle the morning of your surgery, but do not swallow the water     Please see your family doctor/pediatrician for a history and physical and/or concerning medications. Bring any test results/reports from your physicians office. If you are under the care of a heart doctor or specialist doctor, please be aware that you may be asked to them for clearance    You may be asked to stop blood thinners such as Coumadin, Plavix, Fragmin, Lovenox, etc., or any anti-inflammatories such as:  Aspirin, Ibuprofen, Advil, Naproxen prior to your surgery. We also ask that you stop any OTC medications such as fish oil, vitamin E, glucosamine, garlic, Multivitamins, COQ 10, etc.    We ask that you do not smoke 24 hours prior to surgery  We ask that you do not  drink any alcoholic beverages 24 hours prior to surgery     You must make arrangements for a responsible adult to take you home after your surgery. For your safety you will not be allowed to leave alone or drive yourself home. Your surgery will be cancelled if you do not have a ride home. Also for your safety, it is strongly suggested that someone stay with you the first 24 hours after your surgery. A parent or legal guardian must accompany a child scheduled for surgery and plan to stay at the hospital until the child is discharged. Please do not bring other children with you. For your comfort, please wear simple loose fitting clothing to the hospital.  Please do not bring valuables.     Do not wear any make-up or nail polish on your fingers or toes      For your safety, please do not wear any jewelry or body piercing's on the day of surgery. All jewelry must be removed. If you have dentures, they will be removed before going to operating room. For your convenience, we will provide you with a container. If you wear contact lenses or glasses, they will be removed, please bring a case for them. If you have a living will and a durable power of  for healthcare, please bring in a copy. As part of our patient safety program to minimize surgical site infections, we ask you to do the following:    Please notify your surgeon if you develop any illness between         now and the  day of your surgery. This includes a cough, cold, fever, sore throat, nausea,         or vomiting, and diarrhea, etc.   Please notify your surgeon if you experience dizziness, shortness         of breath or blurred vision between now and the time of your surgery. Do not shave your operative site 96 hours prior to surgery. For face and neck surgery, men may use an electric razor 48 hours   prior to surgery. You may shower the night before surgery or the morning of   your surgery with an antibacterial soap. You will need to bring a photo ID and insurance card    [facility] has an onsite pharmacy, would you like to utilize our pharmacy     If you will be staying overnight and use a C-pap machine, please bring   your C-pap to hospital     Our goal is to provide you with excellent care, therefore, visitors will be limited to two(2) in the room at a time so that we may focus on providing this care for you. Please contact pre-admission testing if you have any further questions. [facility] phone number:  [phone] PAT fax number:  [phone]  Please note these are generalized instructions for all surgical cases, you may be provided with more specific instructions according to your surgery.     C-Difficile admission screening and protocol:       * Admitted with diarrhea? [] YES    [x]  NO     *Prior history of C-Diff. In last 3 months? [] YES    [x]  NO     *Antibiotic use in the past 6-8 weeks? [x]  NO    []  YES                 If yes, which ANTIBIOTIC AND REASON______     *Prior hospitalization or nursing home in the last month? []  YES    [x]  NO        SAFETY FIRST. .call before you fall

## 2023-02-22 ENCOUNTER — ANESTHESIA EVENT (OUTPATIENT)
Dept: OPERATING ROOM | Age: 83
End: 2023-02-22
Payer: MEDICARE

## 2023-02-23 ENCOUNTER — HOSPITAL ENCOUNTER (OUTPATIENT)
Age: 83
Setting detail: OUTPATIENT SURGERY
Discharge: HOME | End: 2023-02-23
Attending: PODIATRIST | Admitting: PODIATRIST
Payer: MEDICARE

## 2023-02-23 ENCOUNTER — ANESTHESIA (OUTPATIENT)
Dept: OPERATING ROOM | Age: 83
End: 2023-02-23
Payer: MEDICARE

## 2023-02-23 VITALS
TEMPERATURE: 98 F | BODY MASS INDEX: 36.29 KG/M2 | OXYGEN SATURATION: 99 % | WEIGHT: 180 LBS | HEIGHT: 59 IN | HEART RATE: 81 BPM | DIASTOLIC BLOOD PRESSURE: 57 MMHG | SYSTOLIC BLOOD PRESSURE: 118 MMHG | RESPIRATION RATE: 14 BRPM

## 2023-02-23 DIAGNOSIS — C44.722 SQUAMOUS CELL CARCINOMA OF FOOT, RIGHT: ICD-10-CM

## 2023-02-23 LAB
GLUCOSE BLD-MCNC: 72 MG/DL (ref 70–99)
GLUCOSE BLD-MCNC: 94 MG/DL (ref 70–99)
PERFORMED ON: NORMAL
PERFORMED ON: NORMAL

## 2023-02-23 PROCEDURE — 3700000000 HC ANESTHESIA ATTENDED CARE: Performed by: PODIATRIST

## 2023-02-23 PROCEDURE — 7100000000 HC PACU RECOVERY - FIRST 15 MIN: Performed by: PODIATRIST

## 2023-02-23 PROCEDURE — 2580000003 HC RX 258: Performed by: PODIATRIST

## 2023-02-23 PROCEDURE — 2500000003 HC RX 250 WO HCPCS

## 2023-02-23 PROCEDURE — 6360000002 HC RX W HCPCS

## 2023-02-23 PROCEDURE — 88331 PATH CONSLTJ SURG 1 BLK 1SPC: CPT

## 2023-02-23 PROCEDURE — A4217 STERILE WATER/SALINE, 500 ML: HCPCS | Performed by: PODIATRIST

## 2023-02-23 PROCEDURE — 3600000004 HC SURGERY LEVEL 4 BASE: Performed by: PODIATRIST

## 2023-02-23 PROCEDURE — 2500000003 HC RX 250 WO HCPCS: Performed by: PODIATRIST

## 2023-02-23 PROCEDURE — 6360000002 HC RX W HCPCS: Performed by: PODIATRIST

## 2023-02-23 PROCEDURE — 2500000003 HC RX 250 WO HCPCS: Performed by: ANESTHESIOLOGY

## 2023-02-23 PROCEDURE — 2580000003 HC RX 258: Performed by: ANESTHESIOLOGY

## 2023-02-23 PROCEDURE — 3700000001 HC ADD 15 MINUTES (ANESTHESIA): Performed by: PODIATRIST

## 2023-02-23 PROCEDURE — 2709999900 HC NON-CHARGEABLE SUPPLY: Performed by: PODIATRIST

## 2023-02-23 PROCEDURE — 3600000014 HC SURGERY LEVEL 4 ADDTL 15MIN: Performed by: PODIATRIST

## 2023-02-23 PROCEDURE — 7100000001 HC PACU RECOVERY - ADDTL 15 MIN: Performed by: PODIATRIST

## 2023-02-23 PROCEDURE — 88305 TISSUE EXAM BY PATHOLOGIST: CPT

## 2023-02-23 PROCEDURE — 7100000011 HC PHASE II RECOVERY - ADDTL 15 MIN: Performed by: PODIATRIST

## 2023-02-23 PROCEDURE — 7100000010 HC PHASE II RECOVERY - FIRST 15 MIN: Performed by: PODIATRIST

## 2023-02-23 RX ORDER — PHENYLEPHRINE HCL IN 0.9% NACL 1 MG/10 ML
SYRINGE (ML) INTRAVENOUS PRN
Status: DISCONTINUED | OUTPATIENT
Start: 2023-02-23 | End: 2023-02-23 | Stop reason: SDUPTHER

## 2023-02-23 RX ORDER — IBUPROFEN 800 MG/1
800 TABLET ORAL 2 TIMES DAILY PRN
Qty: 60 TABLET | Refills: 0 | Status: SHIPPED | OUTPATIENT
Start: 2023-02-23 | End: 2023-03-25

## 2023-02-23 RX ORDER — ONDANSETRON 2 MG/ML
4 INJECTION INTRAMUSCULAR; INTRAVENOUS
Status: DISCONTINUED | OUTPATIENT
Start: 2023-02-23 | End: 2023-02-23 | Stop reason: HOSPADM

## 2023-02-23 RX ORDER — SODIUM CHLORIDE 0.9 % (FLUSH) 0.9 %
5-40 SYRINGE (ML) INJECTION PRN
Status: DISCONTINUED | OUTPATIENT
Start: 2023-02-23 | End: 2023-02-23 | Stop reason: HOSPADM

## 2023-02-23 RX ORDER — LIDOCAINE HYDROCHLORIDE 20 MG/ML
INJECTION, SOLUTION EPIDURAL; INFILTRATION; INTRACAUDAL; PERINEURAL PRN
Status: DISCONTINUED | OUTPATIENT
Start: 2023-02-23 | End: 2023-02-23 | Stop reason: SDUPTHER

## 2023-02-23 RX ORDER — DROPERIDOL 2.5 MG/ML
0.62 INJECTION, SOLUTION INTRAMUSCULAR; INTRAVENOUS
Status: DISCONTINUED | OUTPATIENT
Start: 2023-02-23 | End: 2023-02-23 | Stop reason: HOSPADM

## 2023-02-23 RX ORDER — SODIUM CHLORIDE 0.9 % (FLUSH) 0.9 %
5-40 SYRINGE (ML) INJECTION EVERY 12 HOURS SCHEDULED
Status: DISCONTINUED | OUTPATIENT
Start: 2023-02-23 | End: 2023-02-23 | Stop reason: HOSPADM

## 2023-02-23 RX ORDER — FENTANYL CITRATE 50 UG/ML
INJECTION, SOLUTION INTRAMUSCULAR; INTRAVENOUS PRN
Status: DISCONTINUED | OUTPATIENT
Start: 2023-02-23 | End: 2023-02-23 | Stop reason: SDUPTHER

## 2023-02-23 RX ORDER — PROPOFOL 10 MG/ML
INJECTION, EMULSION INTRAVENOUS CONTINUOUS PRN
Status: DISCONTINUED | OUTPATIENT
Start: 2023-02-23 | End: 2023-02-23 | Stop reason: SDUPTHER

## 2023-02-23 RX ORDER — BUPIVACAINE HYDROCHLORIDE 5 MG/ML
INJECTION, SOLUTION EPIDURAL; INTRACAUDAL
Status: COMPLETED | OUTPATIENT
Start: 2023-02-23 | End: 2023-02-23

## 2023-02-23 RX ORDER — SODIUM CHLORIDE 9 MG/ML
INJECTION, SOLUTION INTRAVENOUS PRN
Status: DISCONTINUED | OUTPATIENT
Start: 2023-02-23 | End: 2023-02-23 | Stop reason: HOSPADM

## 2023-02-23 RX ORDER — FENTANYL CITRATE 50 UG/ML
25 INJECTION, SOLUTION INTRAMUSCULAR; INTRAVENOUS EVERY 5 MIN PRN
Status: DISCONTINUED | OUTPATIENT
Start: 2023-02-23 | End: 2023-02-23 | Stop reason: HOSPADM

## 2023-02-23 RX ORDER — OXYCODONE HYDROCHLORIDE 5 MG/1
5 TABLET ORAL
Status: DISCONTINUED | OUTPATIENT
Start: 2023-02-23 | End: 2023-02-23 | Stop reason: HOSPADM

## 2023-02-23 RX ORDER — LIDOCAINE HYDROCHLORIDE 10 MG/ML
INJECTION, SOLUTION EPIDURAL; INFILTRATION; INTRACAUDAL; PERINEURAL
Status: COMPLETED | OUTPATIENT
Start: 2023-02-23 | End: 2023-02-23

## 2023-02-23 RX ORDER — MAGNESIUM HYDROXIDE 1200 MG/15ML
LIQUID ORAL CONTINUOUS PRN
Status: DISCONTINUED | OUTPATIENT
Start: 2023-02-23 | End: 2023-02-23 | Stop reason: HOSPADM

## 2023-02-23 RX ORDER — PROPOFOL 10 MG/ML
INJECTION, EMULSION INTRAVENOUS PRN
Status: DISCONTINUED | OUTPATIENT
Start: 2023-02-23 | End: 2023-02-23 | Stop reason: SDUPTHER

## 2023-02-23 RX ADMIN — PROPOFOL 75 MCG/KG/MIN: 10 INJECTION, EMULSION INTRAVENOUS at 12:30

## 2023-02-23 RX ADMIN — FENTANYL CITRATE 50 MCG: 50 INJECTION INTRAMUSCULAR; INTRAVENOUS at 12:25

## 2023-02-23 RX ADMIN — FENTANYL CITRATE 12.5 MCG: 50 INJECTION INTRAMUSCULAR; INTRAVENOUS at 12:57

## 2023-02-23 RX ADMIN — Medication 100 MCG: at 13:34

## 2023-02-23 RX ADMIN — Medication 200 MCG: at 13:24

## 2023-02-23 RX ADMIN — SODIUM CHLORIDE: 9 INJECTION, SOLUTION INTRAVENOUS at 12:24

## 2023-02-23 RX ADMIN — LIDOCAINE HYDROCHLORIDE 60 MG: 20 INJECTION, SOLUTION EPIDURAL; INFILTRATION; INTRACAUDAL; PERINEURAL at 12:30

## 2023-02-23 RX ADMIN — Medication 100 MCG: at 13:13

## 2023-02-23 RX ADMIN — Medication 200 MCG: at 13:18

## 2023-02-23 RX ADMIN — Medication 100 MCG: at 12:50

## 2023-02-23 RX ADMIN — CEFAZOLIN 2000 MG: 2 INJECTION, POWDER, FOR SOLUTION INTRAMUSCULAR; INTRAVENOUS at 12:24

## 2023-02-23 RX ADMIN — Medication 100 MCG: at 13:26

## 2023-02-23 RX ADMIN — Medication 100 MCG: at 13:04

## 2023-02-23 RX ADMIN — PROPOFOL 40 MG: 10 INJECTION, EMULSION INTRAVENOUS at 12:31

## 2023-02-23 RX ADMIN — Medication 100 MCG: at 12:48

## 2023-02-23 RX ADMIN — Medication 100 MCG: at 13:09

## 2023-02-23 RX ADMIN — Medication 100 MCG: at 12:57

## 2023-02-23 RX ADMIN — FENTANYL CITRATE 25 MCG: 50 INJECTION INTRAMUSCULAR; INTRAVENOUS at 13:27

## 2023-02-23 RX ADMIN — Medication 100 MCG: at 12:54

## 2023-02-23 RX ADMIN — FENTANYL CITRATE 12.5 MCG: 50 INJECTION INTRAMUSCULAR; INTRAVENOUS at 12:44

## 2023-02-23 RX ADMIN — Medication 100 MCG: at 13:11

## 2023-02-23 ASSESSMENT — PAIN SCALES - GENERAL
PAINLEVEL_OUTOF10: 0
PAINLEVEL_OUTOF10: 0

## 2023-02-23 ASSESSMENT — PAIN - FUNCTIONAL ASSESSMENT: PAIN_FUNCTIONAL_ASSESSMENT: NONE - DENIES PAIN

## 2023-02-23 ASSESSMENT — ENCOUNTER SYMPTOMS: SHORTNESS OF BREATH: 0

## 2023-02-23 NOTE — H&P
Update History & Physical    The patient's History and Physical of February 16, 2023 was reviewed with the patient and I examined the patient. There was no change. The surgical site was confirmed by the patient and me. Plan: The risks, benefits, expected outcome, and alternative to the recommended procedure have been discussed with the patient. Patient understands and wants to proceed with the procedure.      Electronically signed by Kj Thapa DPM on 2/23/2023 at 12:21 PM

## 2023-02-23 NOTE — DISCHARGE INSTRUCTIONS
Podiatric Post Operative Instructions: You have had a surgical procedure on your right foot. Fluids and Diet:  Begin with clear liquids, broth, dry toast, and crackers. If not nauseated then resume your regular pre-operative diet when you are ready    Medications: Take your prescriptions as directed  You are receiving new prescriptions for Ibuprofen   If your pain is not severe then you may take the non-prescription medication that you normally take for aches and pains  You may resume your regularly scheduled medications (unless otherwise directed)  If any side effects or adverse reactions occur, discontinue the medication and contact your doctor. Review the patient drug information that is provided before you take any medication    Ambulation and Activity:  You are advised to go directly home from the hospital  You may put weight on the operated foot. You should wear the surgical shoe at all times when awake. Avoid stairs if possible. Do not lift or move heavy objects  Do not drive until cleared by Dr. Minoo Keller, [place]    Bandage and Wound Care Instructions:  Keep bandage clean and dry  Do not shower or bathe the operative extremity  Do not remove the bandage (unless otherwise directed)  Do not attempt to put anything between the cast or dressing and your skin, some itching is normal.    Ice and Elevation:  Elevate operative extremity as much as possible to reduce swelling and discomfort. Elevate with 2 pillows at or above the level of the heart for the first 72 hours. Ice:  [facility] dispensed insulated ice bag over the bandage 20 minutes of every hour while awake for the first 72 hours. You may behind the knee as well. Special Instructions: Call your doctor immediately if you develop any of the following. Fever over 100 degrees by mouth - take your temperature daily until your first follow up visit.   Pain not relieved by medication ordered  Swelling, increased redness, warmth, or hardness around operative area. Numb, tingling or cold toes. Toe(s) become white or bluish  Bandage becomes wet, soiled, or blood soaked (small amount of bleeding may be normal)  Increased or progressive drainage from surgical area. Follow up instructions: You will need to follow up with Dr. Chantell Sultana office in the next 5 to 7 days. Call [phone] when you get home to make an appointment. Call Dr. Aparna Hall 's office if you have any questions or concerns.     Dr. Spenser Carson, [facility]  Office: [phone]  Fax: [phone]

## 2023-02-23 NOTE — BRIEF OP NOTE
Brief Postoperative Note      Patient: Percival Holstein Re  YOB: 1940  MRN: [id number]    Date of Procedure: 2/23/2023    Pre-Op Diagnosis: SQUAMOUS CELL CARCINOMA, RIGHT FOOT    Post-Op Diagnosis: Same       Procedure(s):  ADJACENT TISSUE TRANSFER ON RIGHT FOOT, EXCISION OF SOFT TISSUE TUMOR OF FOOT LESS THAN 1.5 CENTIMETERS    Surgeon(s):  Atilio Alarcon DPM    Assistant:  Surgical Assistant: Jaylen Angel RN    Anesthesia: Monitor Anesthesia Care    Hemostasis: anatomic dissection, electrocautery    Materials: 4-0 Nylon    Injectables:  Pre-op: 20 cc of 1% Lidocaine plain  Post-op: 30 cc of 0.5% marcaine plain    Estimated Blood Loss (mL): less than 10 cc    Complications: None    Specimens:   ID Type Source Tests Collected by Time Destination   A : A-RIGHT foot squamous cell, tag oriented medially Tissue Tissue SURGICAL PATHOLOGY Atilio Alarcon DPM 2/23/2023 1241        Implants:  * No implants in log *      Drains: * No LDAs found *    Findings: Clean margins determined intraoperative with pathologist confirmation prior to closure. Approximately 1 cm in diameter hyperpigmented lesion with irregular borders and overlying serous crust appreciated pre-operatively.      Kosta Bentley DPM   Podiatric Resident PGY1  Pager [phone] or [employer]  2/23/2023, 1:48 PM     Electronically signed by Kosta Bentley DPM on 2/23/2023 at 1:46 PM

## 2023-02-23 NOTE — ANESTHESIA PRE PROCEDURE
Department of Anesthesiology  Preprocedure Note       Name:  Daniella Payne   Age:  78 y.o.  :  1940                                          MRN:  [id number]         Date:  2023      Surgeon: Dianne Taveras):  Tosin Patient, DPM    Procedure: Procedure(s):  ADJACENT TISSUE TRANSFER ON RIGHT FOOT, EXCISION OF SOFT TISSUE TUMOR OF FOOT LESS THAN 1.5 CENTIMETERS    Medications prior to admission:   Prior to Admission medications    Medication Sig Start Date End Date Taking? Authorizing Provider   apixaban (ELIQUIS) 5 MG TABS tablet Take by mouth 2 times daily   Yes Historical Provider, MD   atorvastatin (LIPITOR) 40 MG tablet Take 40 mg by mouth daily   Yes Historical Provider, MD   buprenorphine (BUPRENEX) 5 MCG/HR PTWK PLACE 1 PATCH ONTO THE SKIN ONCE WEEKLY FOR 28 DAYS 22  Yes Historical Provider, MD   fenofibrate (TRIGLIDE) 160 MG tablet Take 160 mg by mouth daily   Yes Historical Provider, MD   metoprolol succinate (TOPROL XL) 25 MG extended release tablet Take 25 mg by mouth daily   Yes Historical Provider, MD   potassium chloride (MICRO-K) 10 MEQ extended release capsule Take 10 mEq by mouth 2 times daily   Yes Historical Provider, MD   gabapentin (NEURONTIN) 300 MG capsule Take 1 capsule by mouth in the morning and 1 capsule before bedtime. Do all this for 30 days.  22  Neftali Dill MD   dilTIAZem (CARDIZEM CD) 120 MG extended release capsule daily 9/3/21   Historical Provider, MD   baclofen (LIORESAL) 10 MG tablet Take 10 mg by mouth nightly    Historical Provider, MD   Calcium Carb-Cholecalciferol 600-100 MG-UNIT CAPS Take by mouth    Historical Provider, MD   cetirizine (ZYRTEC) 10 MG tablet TAKE ONE TABLET BY MOUTH EVERY DAY 11/9/15   Historical Provider, MD   ferrous sulfate 325 (65 Fe) MG tablet Take 325 mg by mouth    Historical Provider, MD   furosemide (LASIX) 40 MG tablet 20 mg 17   Historical Provider, MD   indapamide (LOZOL) 1.25 MG tablet TAKE 1 TABLET DAILY 17 Historical Provider, MD   losartan (COZAAR) 100 MG tablet 50 mg daily 12/6/17   Historical Provider, MD   metFORMIN (GLUCOPHAGE) 500 MG tablet 500 mg 2 times daily (with meals)  2/21/17   Historical Provider, MD   levothyroxine (SYNTHROID) 75 MCG tablet Take 75 mcg by mouth Daily  6/7/17   Historical Provider, MD       Current medications:    Current Facility-Administered Medications   Medication Dose Route Frequency Provider Last Rate Last Admin   • ceFAZolin (ANCEF) 2,000 mg in sodium chloride 0.9 % 50 mL IVPB (mini-bag)  2,000 mg IntraVENous Once Liyah Sauceda DPM       • sodium chloride flush 0.9 % injection 5-40 mL  5-40 mL IntraVENous 2 times per day Nic Yeager MD       • sodium chloride flush 0.9 % injection 5-40 mL  5-40 mL IntraVENous PRN Nic Yeager MD       • 0.9 % sodium chloride infusion   IntraVENous PRN Nic Yeager MD           Allergies:    Allergies   Allergen Reactions   • Cymbalta [Duloxetine Hcl] Nausea And Vomiting, Anxiety and Other (See Comments)     sucuidial thoughts, loss of balance, shaky    • Sulfa Antibiotics Swelling       Problem List:    Patient Active Problem List   Diagnosis Code   • Ulcer of left heel, with fat layer exposed (HCC) L97.422   • 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   • Squamous cell carcinoma in situ of dorsum of left foot D04.72   • Pneumonia J18.9       Past Medical History:        Diagnosis Date   • A-fib (HCC)    • Arthritis    • Cancer (HCC)     uterine   • Diabetes mellitus (HCC)     Pre DM   • GI bleed    • Hyperlipidemia    • Thyroid disease        Past Surgical History:        Procedure Laterality Date   • BACK SURGERY     • CATARACT REMOVAL Bilateral    • COLONOSCOPY     • HYSTERECTOMY (CERVIX STATUS UNKNOWN)     • JOINT REPLACEMENT Right     knee   • KNEE SURGERY Right    • TONSILLECTOMY     • WISDOM TOOTH EXTRACTION         Social History:    Social History     Tobacco Use   • Smoking status: Never   • Smokeless tobacco: Never  Substance Use Topics    Alcohol use: No                                Counseling given: Not Answered      Vital Signs (Current):   Vitals:    02/15/23 1511 02/23/23 1047   BP:  116/78   Pulse:  66   Resp:  16   Temp:  98.2 °F (36.8 °C)   TempSrc:  Temporal   SpO2:  98%   Weight: 183 lb (83 kg) 180 lb (81.6 kg)   Height: 4' 11\" (1.499 m) 4' 11\" (1.499 m)                                              BP Readings from Last 3 Encounters:   02/23/23 116/78   01/04/23 106/70   11/09/22 132/80       NPO Status: Time of last liquid consumption: 2300                        Time of last solid consumption: 2300                        Date of last liquid consumption: 02/22/23                        Date of last solid food consumption: 02/22/23    BMI:   Wt Readings from Last 3 Encounters:   02/23/23 180 lb (81.6 kg)   01/04/23 184 lb (83.5 kg)   11/09/22 173 lb (78.5 kg)     Body mass index is 36.36 kg/m².     CBC:   Lab Results   Component Value Date/Time    WBC 7.7 11/20/2019 09:40 AM    RBC 3.32 11/20/2019 09:40 AM    HGB 10.8 11/20/2019 09:40 AM    HCT 32.0 11/20/2019 09:40 AM    MCV 96.5 11/20/2019 09:40 AM    RDW 13.8 11/20/2019 09:40 AM     11/20/2019 09:40 AM       CMP:   Lab Results   Component Value Date/Time     11/20/2019 09:40 AM    K 3.3 11/20/2019 09:40 AM    K 3.4 11/19/2019 07:24 AM     11/20/2019 09:40 AM    CO2 22 11/20/2019 09:40 AM    BUN 17 11/20/2019 09:40 AM    CREATININE 0.8 11/20/2019 09:40 AM    GFRAA >60 11/20/2019 09:40 AM    GFRAA >60 05/29/2013 08:30 AM    AGRATIO 1.4 11/18/2019 04:20 PM    LABGLOM >60 11/20/2019 09:40 AM    GLUCOSE 96 11/20/2019 09:40 AM    PROT 6.8 11/18/2019 04:20 PM    PROT 6.7 02/13/2013 09:30 AM    CALCIUM 8.9 11/20/2019 09:40 AM    BILITOT 0.5 11/18/2019 04:20 PM    ALKPHOS 61 11/18/2019 04:20 PM    AST 39 11/18/2019 04:20 PM    ALT 14 11/18/2019 04:20 PM       POC Tests: No results for input(s): POCGLU, POCNA, POCK, POCCL, POCBUN, POCHEMO, POCHCT in the last 72 hours. Coags:   Lab Results   Component Value Date/Time    PROTIME 13.4 12/20/2009 05:21 PM    INR 1.24 12/20/2009 05:21 PM    APTT 27.8 12/20/2009 05:21 PM       HCG (If Applicable): No results found for: PREGTESTUR, PREGSERUM, HCG, HCGQUANT     ABGs: No results found for: PHART, PO2ART, SHD4DTE, VQW0WSE, BEART, P9VZQSBE     Type & Screen (If Applicable):  Lab Results   Component Value Date    LABABO O 12/20/2009    LABRH Positive 12/20/2009       Drug/Infectious Status (If Applicable):  No results found for: HIV, HEPCAB    COVID-19 Screening (If Applicable): No results found for: COVID19        Anesthesia Evaluation  Patient summary reviewed no history of anesthetic complications:   Airway: Mallampati: III  TM distance: >3 FB   Neck ROM: full  Mouth opening: > = 3 FB   Dental: normal exam         Pulmonary:normal exam        (-) shortness of breath                           Cardiovascular:  Exercise tolerance: no interval change,   (+) dysrhythmias (s/p cardioversion): atrial fibrillation, hyperlipidemia    (-) hypertension,  angina and  LOUISE      Rhythm: regular  Rate: normal                 ROS comment: TTE 2021:  Study Conclusions     - Left ventricle: The cavity size is normal. The calculated     ejection fraction was 54%. Ejection fraction (EF) was calculated     using 2D biplane Porter&apos;s method. - Aortic valve: The valve was structurally normal. The valve was     trileaflet. - Mitral valve: The annulus is mildly calcified. - Left atrium: The atrium is markedly dilated. - Inferior vena cava: The vessel was dilated; the respirophasic     diameter changes were blunted (< 50%); findings are consistent     with elevated central venous pressure. Impressions:  No previous study was available for comparison.         Neuro/Psych:   (+) neuromuscular disease (significant back pain):,              ROS comment: buprenorphine GI/Hepatic/Renal:        (-) liver disease and no renal disease       Endo/Other:    (+) Diabetes, blood dyscrasia (eliquis): anticoagulation therapy:., .                 Abdominal:             Vascular: negative vascular ROS. Other Findings:           Anesthesia Plan      MAC     ASA 3     (77 yo F with PMHx of afib, HLD, DM2, back pain on buprenorphine patch presents for right foot tissue transfer. Has significant pain laying flat on back. If able to operate on stretcher then can maintain high elevation of back/head. Discussed risks and benefits to sedation including nausea, vomiting, allergic reaction, headache, delayed cognitive recovery, stroke, heart attack, respiratory depression, and death which patient understood and agreed to proceed. The patient was given the opportunity to ask questions and all questions were answered to the patient's satisfaction.  )  Induction: intravenous. MIPS: Postoperative opioids intended and Prophylactic antiemetics administered. Anesthetic plan and risks discussed with patient. Plan discussed with CRNA. This pre-anesthesia assessment may be used as a history and physical.    DOS STAFF ADDENDUM:    Pt seen and examined, chart reviewed (including anesthesia, drug and allergy history). No interval changes to history and physical examination. Anesthetic plan, risks, benefits, alternatives, and personnel involved discussed with patient. Patient verbalized an understanding and agrees to proceed.       Alpesh Ordoñez MD  February 23, 2023  11:21 AM

## 2023-02-23 NOTE — ANESTHESIA POSTPROCEDURE EVALUATION
Department of Anesthesiology  Postprocedure Note    Patient: Faith Payne  MRN: [id number]  YOB: 1940  Date of evaluation: 2/23/2023      Procedure Summary     Date: 02/23/23 Room / Location: [address]    Anesthesia Start: 3780 Anesthesia Stop: 1400    Procedure: ADJACENT TISSUE TRANSFER ON RIGHT FOOT, EXCISION OF SOFT TISSUE TUMOR OF FOOT LESS THAN 1.5 CENTIMETERS (Right: Foot) Diagnosis:       Squamous cell carcinoma of foot, right      (SQUAMOUS CELL CARCINOMA, RIGHT FOOT)    Surgeons: Alisha Duke DPM Responsible Provider: Gloria Ferreira MD    Anesthesia Type: MAC ASA Status: 3          Anesthesia Type: No value filed. Johanna Phase I: Johanna Score: 10    Johanna Phase II: Johanna Score: 10      Anesthesia Post Evaluation    Patient location during evaluation: PACU  Patient participation: complete - patient participated  Level of consciousness: awake  Airway patency: patent  Nausea & Vomiting: no nausea and no vomiting  Cardiovascular status: blood pressure returned to baseline  Respiratory status: acceptable  Hydration status: stable  Comments: Vital signs stable  OK to discharge from Stage I post anesthesia care.   Care transferred from Anesthesiology department on discharge from perioperative area   Multimodal analgesia pain management approach

## 2023-02-23 NOTE — PROGRESS NOTES
PACU Transfer to [facility]    Vitals:    02/23/23 1355   BP: 106/70   Pulse: 79   Resp: 16   Temp:    SpO2: 98%         Intake/Output Summary (Last 24 hours) at 2/23/2023 1402  Last data filed at 2/23/2023 1224  Gross per 24 hour   Intake 50 ml   Output --   Net 50 ml       Pain assessment:  none  Pain Level: 0    Patient transferred to care of [facility] RN.    2/23/2023 2:02 PM

## 2023-02-23 NOTE — PROGRESS NOTES
Patient admitted to PACU # 5 from OR at 1346 post ADJACENT TISSUE TRANSFER ON RIGHT FOOT, EXCISION OF SOFT TISSUE TUMOR OF FOOT LESS THAN 1.5 CENTIMETERS - Right per MD Sauceda.  Attached to PACU monitoring system and report received from anesthesia provider.  Patient was reported to be hemodynamically stable during procedure.  Patient drowsy on admission and denied pain. Dressing is clean, dry, and intact. Walking boot in place. Will c continue to monitor.

## 2023-02-24 NOTE — OP NOTE
[address] [facility]                                OPERATIVE REPORT    PATIENT NAME: FIONA DEE                          :        1940  MED REC NO:   [id number]                          ROOM:  ACCOUNT NO:   [de-identified]                           ADMIT DATE: 2023  PROVIDER:     Gabriela Rangel DPM    DATE OF PROCEDURE:  2023    PREOPERATIVE DIAGNOSIS:  Squamous cell carcinoma, right foot. POSTOPERATIVE DIAGNOSIS:  Squamous cell carcinoma, right foot. OPERATION PERFORMED:  1. Excision of soft tissue neoplasm of less than 1.5 cm left foot. 2.  Adjacent tissue transfer via single lobe rotation flap right foot. SURGEON:  Gabriela Rangel DPM    ASSISTANT:  _____FEMI, PGY-1    PATHOLOGY:  A fresh frozen specimen of the excised lesion with a 3 mm  margin was sent to pathology and confirmed that the margins were clean. HEMOSTASIS:  Anatomic dissection and electrocauterization. ESTIMATED BLOOD LOSS:  Less than 10 mL. MATERIALS:  Include 4-0 nylon suture. ANESTHESIA:  MAC with a local block consisting a total of 20 mL of 1%  lidocaine plain. Postoperative injection consisting of 30 mL of 0.5%  Marcaine plain. INTRAOPERATIVE FINDINGS:  Include clean margins intraoperatively  confirmed by pathology prior to closure. OPERATIVE PROCEDURE:  The patient was brought from the preoperative area  and placed on the operating table in the supine position. Following  induction of IV sedation, a local block consisting a total of 20 mL of  1% lidocaine plain was administered in an ankle ring block fashion well  clear of the operative field. The patient's right lower extremity was  scrubbed, prepped, and draped in the usual sterile manner. A time-out  was performed. The patient, procedure, and operative site were  confirmed, and the following procedure was then performed.     Excision of soft tissue neoplasm less than 1.5 cm: At this time,  attention was directed towards the lateral aspect of the patient's right  foot where previously a lesion had been biopsied and confirmed to be a  squamous cell carcinoma. At this time, the lesion was circumscribed  with a 3 mm ring of healthy tissue surrounding the ulceration, was  excised with a #15 blade. It was tagged with a suture with the lesion  oriented medially. The lesion was then extirpated in total from  surgical site and sent to pathology for fresh frozen section. While we  were waiting, hemostasis was achieved with electrocauterization. Once  confirmed that there was adequate resection and clean margins, decision  was now made to proceed with a single lobe rotational flap to cover the  defect. Adjacent tissue transfer via a single lobe rotation flap right foot: At  this time, a 1.6 cm x 1.6 cm soft tissue density was noted from the  previous excision of the lesion. At this time, a single lobe flap was  raised medial to the lesion to take advantage of the dermatome from the  tibialis anterior artery. There was undermining performed at the wound  edges circumferentially including the flap. The flap including the  subcutaneous tissue was raised and rotated laterally. The skin edges  were then secured utilizing 4-0 nylon suture. This did close completely  the 1.7 in diameter soft tissue defect without any tension. There was  intact capillary fill time noted to be present at the rotation flap. Being happy with the correction, a postoperative injection consisting a  total of 30 mL of 0.5% Marcaine plain was administered throughout in an  ankle ring block fashion for postoperative analgesia. The wound was  then dressed with Betadine ointment, sterile Adaptic, sterile 4x4s,  4x8s, sterile Kerlix, and Laurie. A gentle compression dressing was  applied and a postoperative shoe was dispensed. The patient tolerated the procedure and anesthesia well.   The patient  left the OR with vital signs stable and vascular status intact to all  digits of the right foot. Following a period of postop monitoring, the  patient will be discharged home with oral and written instructions per  myself. She will follow up in my office within the next three to five  days for her first postoperative visit.         Zaira Garcia DPM    D: 02/23/2023 14:05:49       T: 02/23/2023 14:33:46     [email or web address]  Job#: [id number]     Doc#: [id number]    CC:  Curtis Lou DPM

## 2023-03-01 ENCOUNTER — OFFICE VISIT (OUTPATIENT)
Dept: PAIN MANAGEMENT | Age: 83
End: 2023-03-01
Payer: MEDICARE

## 2023-03-01 VITALS
SYSTOLIC BLOOD PRESSURE: 102 MMHG | DIASTOLIC BLOOD PRESSURE: 70 MMHG | HEART RATE: 93 BPM | HEIGHT: 59 IN | WEIGHT: 187.4 LBS | BODY MASS INDEX: 37.78 KG/M2 | OXYGEN SATURATION: 99 %

## 2023-03-01 DIAGNOSIS — E11.42 DIABETIC POLYNEUROPATHY ASSOCIATED WITH TYPE 2 DIABETES MELLITUS (HCC): ICD-10-CM

## 2023-03-01 DIAGNOSIS — M48.56XS NONTRAUMATIC COMPRESSION FRACTURE OF L4 VERTEBRA, SEQUELA: ICD-10-CM

## 2023-03-01 DIAGNOSIS — M51.36 DDD (DEGENERATIVE DISC DISEASE), LUMBAR: ICD-10-CM

## 2023-03-01 DIAGNOSIS — G89.4 CHRONIC PAIN SYNDROME: ICD-10-CM

## 2023-03-01 PROCEDURE — 99213 OFFICE O/P EST LOW 20 MIN: CPT | Performed by: INTERNAL MEDICINE

## 2023-03-01 PROCEDURE — 1123F ACP DISCUSS/DSCN MKR DOCD: CPT | Performed by: INTERNAL MEDICINE

## 2023-03-01 RX ORDER — GABAPENTIN 300 MG/1
300 CAPSULE ORAL 2 TIMES DAILY
Qty: 60 CAPSULE | Refills: 1 | Status: SHIPPED | OUTPATIENT
Start: 2023-03-01 | End: 2023-03-31

## 2023-03-01 RX ORDER — BUPRENORPHINE 5 UG/H
1 PATCH TRANSDERMAL WEEKLY
Qty: 4 PATCH | Refills: 1 | Status: SHIPPED | OUTPATIENT
Start: 2023-03-01 | End: 2023-03-29

## 2023-03-01 NOTE — PROGRESS NOTES
Abby Tucker   1940  [id number]      HISTORY OF PRESENT ILLNESS:  Ms. Margie Smith is a 78 y.o. female returns for a follow up visit for pain management  She has a diagnosis of   1. Chronic pain syndrome    2. Nontraumatic compression fracture of L5 vertebra, sequela    3. DDD (degenerative disc disease), lumbar    4. Diabetic polyneuropathy associated with type 2 diabetes mellitus ([facility] [address]    5. Encounter for therapeutic drug monitoring    . She complains of pain in the  Low back, buttock   She rates the pain 3/10 and describes it as aching. Current treatment regimen has helped relieve about 80% of the pain. She denies any side effects from the current pain regimen. Patient reports that since the last follow up visit the physical functioning is unchanged, family/social relationships are unchanged, mood is unchanged sleep patterns are unchanged, and that the overall functioning is unchanged. Patient denies misusing/abusing her narcotic pain medications or using any illegal drugs. There are No indicators for possible drug abuse, addiction or diversion problems. Patient reports she has been doing fair. She mentions she has been walking some daily. She denies any side effects. She says she is using Butrans along with Neurontin 300 mg BID. She complains her back and leg hurts the most.     ALLERGIES: Patients list of allergies were reviewed     MEDICATIONS: Ms. Payne list of medications were reviewed. Her current medications are   Outpatient Medications Prior to Visit   Medication Sig Dispense Refill    ibuprofen (ADVIL;MOTRIN) 800 MG tablet Take 1 tablet by mouth 2 times daily as needed for Pain 60 tablet 0    apixaban (ELIQUIS) 5 MG TABS tablet Take by mouth 2 times daily      atorvastatin (LIPITOR) 40 MG tablet Take 40 mg by mouth daily      buprenorphine (BUPRENEX) 5 MCG/HR PTWK PLACE 1 PATCH ONTO THE SKIN ONCE WEEKLY FOR 28 DAYS      fenofibrate (TRIGLIDE) 160 MG tablet Take 160 mg by mouth daily      metoprolol succinate (TOPROL XL) 25 MG extended release tablet Take 25 mg by mouth daily      potassium chloride (MICRO-K) 10 MEQ extended release capsule Take 10 mEq by mouth 2 times daily      gabapentin (NEURONTIN) 300 MG capsule Take 1 capsule by mouth in the morning and 1 capsule before bedtime. Do all this for 30 days. 60 capsule 1    dilTIAZem (CARDIZEM CD) 120 MG extended release capsule daily      baclofen (LIORESAL) 10 MG tablet Take 10 mg by mouth nightly      Calcium Carb-Cholecalciferol 600-100 MG-UNIT CAPS Take by mouth      cetirizine (ZYRTEC) 10 MG tablet TAKE ONE TABLET BY MOUTH EVERY DAY      ferrous sulfate 325 (65 Fe) MG tablet Take 325 mg by mouth      furosemide (LASIX) 40 MG tablet 20 mg      indapamide (LOZOL) 1.25 MG tablet TAKE 1 TABLET DAILY      losartan (COZAAR) 100 MG tablet 50 mg daily      metFORMIN (GLUCOPHAGE) 500 MG tablet 500 mg 2 times daily (with meals)       levothyroxine (SYNTHROID) 75 MCG tablet Take 75 mcg by mouth Daily        No facility-administered medications prior to visit. REVIEW OF SYSTEMS:    Respiratory: Negative for apnea, chest tightness and shortness of breath or change in baseline breathing. PHYSICAL EXAM:   Nursing note and vitals reviewed. /70   Pulse 93   Ht 4' 11\" (1.499 m)   Wt 187 lb 6.4 oz (85 kg)   SpO2 99%   BMI 37.85 kg/m²   Constitutional: She appears well-developed and well-nourished. No acute distress. Cardiovascular: Normal rate, regular rhythm, normal heart sounds, and does not have murmur. Pulmonary/Chest: Effort normal. No respiratory distress. She does not have wheezes in the lung fields. She has no rales. Neurological/Psychiatric:She is alert and oriented to person, place, and time. Coordination is  normal.  Her mood isAppropriate and affect is Neutral/Euthymic(normal) . Other: using a cane     IMPRESSION:   1. Chronic pain syndrome    2. Nontraumatic compression fracture of L5 vertebra, sequela    3.  DDD (degenerative disc disease), lumbar    4. Diabetic polyneuropathy associated with type 2 diabetes mellitus ([facility] [address]    5. Encounter for therapeutic drug monitoring        PLAN:  Informed verbal consent was obtained  -[place] was obtained and reviewed  for the last one year and no indicators of drug misuse  were found. Any other controlled substance prescriptions  seen on the record have been accounted for, I am aware of the patient receiving these medications. Eva Lang[place] record will be rechecked as part of office protocol. -ROM/Stretching exercises as advised   -Continue with Butrans with Neurontin   -Will try Voltaren gel   -She was advised to increase fluids ( 5-7  glasses of fluid daily), limit caffeine, avoid cheese products, increase dietary fiber, increase activity and exercise as tolerated and relax regularly and enjoy meals   Current Outpatient Medications   Medication Sig Dispense Refill    ibuprofen (ADVIL;MOTRIN) 800 MG tablet Take 1 tablet by mouth 2 times daily as needed for Pain 60 tablet 0    apixaban (ELIQUIS) 5 MG TABS tablet Take by mouth 2 times daily      atorvastatin (LIPITOR) 40 MG tablet Take 40 mg by mouth daily      buprenorphine (BUPRENEX) 5 MCG/HR PTWK PLACE 1 PATCH ONTO THE SKIN ONCE WEEKLY FOR 28 DAYS      fenofibrate (TRIGLIDE) 160 MG tablet Take 160 mg by mouth daily      metoprolol succinate (TOPROL XL) 25 MG extended release tablet Take 25 mg by mouth daily      potassium chloride (MICRO-K) 10 MEQ extended release capsule Take 10 mEq by mouth 2 times daily      gabapentin (NEURONTIN) 300 MG capsule Take 1 capsule by mouth in the morning and 1 capsule before bedtime. Do all this for 30 days.  60 capsule 1    dilTIAZem (CARDIZEM CD) 120 MG extended release capsule daily      baclofen (LIORESAL) 10 MG tablet Take 10 mg by mouth nightly      Calcium Carb-Cholecalciferol 600-100 MG-UNIT CAPS Take by mouth      cetirizine (ZYRTEC) 10 MG tablet TAKE ONE TABLET BY MOUTH EVERY DAY      ferrous sulfate 325 (65 Fe) MG tablet Take 325 mg by mouth      furosemide (LASIX) 40 MG tablet 20 mg      indapamide (LOZOL) 1.25 MG tablet TAKE 1 TABLET DAILY      losartan (COZAAR) 100 MG tablet 50 mg daily      metFORMIN (GLUCOPHAGE) 500 MG tablet 500 mg 2 times daily (with meals)       levothyroxine (SYNTHROID) 75 MCG tablet Take 75 mcg by mouth Daily        No current facility-administered medications for this visit. I will continue her current medication regimen  which is part of the above treatment schedule. It has been helping with Ms. Payne's chronic  medical problems which for this visit include:   Diagnoses of Chronic pain syndrome, Nontraumatic compression fracture of L5 vertebra, sequela, DDD (degenerative disc disease), lumbar, Diabetic polyneuropathy associated with type 2 diabetes mellitus ([facility] [address] and Encounter for therapeutic drug monitoring were pertinent to this visit. Risks and benefits of the medications and other alternative treatments  including no treatment were discussed with the patient. The common side effects of these medications were also explained to the patient. Informed verbal consent was obtained. Goals of current treatment regimen include improvement in pain, restoration of functioning- with focus on improvement in physical performance, general activity, work or disability,emotional distress, health care utilization and  decreased medication consumption. Will continue to monitor progress towards achieving/maintaining therapeutic goals with special emphasis on  1. Improvement in perceived interfernce  of pain with ADL's. Ability to do home exercises independently. Ability to do household chores indoor and/or outdoor work and social and leisure activities. Improve psychosocial and physical functioning. - she is showing progression towards this treatment goal with the current regimen.        She was advised against drinking alcohol with the narcotic pain medicines, advised against driving or handling machinery while adjusting the dose of medicines or if having cognitive  issues related to the current medications. Risk of overdose and death, if medicines not taken as prescribed, were also discussed. If the patient develops new symptoms or if the symptoms worsen, the patient should call the office. While transcribing every attempt was made to maintain the accuracy of the note in terms of it's contents,there may have been some errors made inadvertently. Thank you for allowing me to participate in the care of this patient.     Mayra Hernandez MD.    Cc: Breann Ty MD

## 2023-03-29 DIAGNOSIS — M51.36 DDD (DEGENERATIVE DISC DISEASE), LUMBAR: ICD-10-CM

## 2023-03-29 DIAGNOSIS — E11.42 DIABETIC POLYNEUROPATHY ASSOCIATED WITH TYPE 2 DIABETES MELLITUS (HCC): ICD-10-CM

## 2023-03-29 DIAGNOSIS — M48.56XS NONTRAUMATIC COMPRESSION FRACTURE OF L4 VERTEBRA, SEQUELA: ICD-10-CM

## 2023-03-29 DIAGNOSIS — G89.4 CHRONIC PAIN SYNDROME: ICD-10-CM

## 2023-04-17 RX ORDER — BUPRENORPHINE 5 UG/H
PATCH TRANSDERMAL
Qty: 4 PATCH | Refills: 1 | OUTPATIENT
Start: 2023-04-17

## 2023-04-26 ENCOUNTER — OFFICE VISIT (OUTPATIENT)
Dept: PAIN MANAGEMENT | Age: 83
End: 2023-04-26
Payer: MEDICARE

## 2023-04-26 VITALS
SYSTOLIC BLOOD PRESSURE: 90 MMHG | HEART RATE: 58 BPM | DIASTOLIC BLOOD PRESSURE: 64 MMHG | WEIGHT: 181 LBS | BODY MASS INDEX: 36.56 KG/M2

## 2023-04-26 DIAGNOSIS — G89.4 CHRONIC PAIN SYNDROME: ICD-10-CM

## 2023-04-26 DIAGNOSIS — M51.36 DDD (DEGENERATIVE DISC DISEASE), LUMBAR: ICD-10-CM

## 2023-04-26 DIAGNOSIS — M48.56XS NONTRAUMATIC COMPRESSION FRACTURE OF L4 VERTEBRA, SEQUELA: ICD-10-CM

## 2023-04-26 DIAGNOSIS — E11.42 DIABETIC POLYNEUROPATHY ASSOCIATED WITH TYPE 2 DIABETES MELLITUS (HCC): ICD-10-CM

## 2023-04-26 DIAGNOSIS — Z51.81 ENCOUNTER FOR THERAPEUTIC DRUG MONITORING: ICD-10-CM

## 2023-04-26 PROCEDURE — 1123F ACP DISCUSS/DSCN MKR DOCD: CPT | Performed by: INTERNAL MEDICINE

## 2023-04-26 PROCEDURE — 99213 OFFICE O/P EST LOW 20 MIN: CPT | Performed by: INTERNAL MEDICINE

## 2023-04-26 RX ORDER — BUPRENORPHINE 5 UG/H
1 PATCH TRANSDERMAL WEEKLY
Qty: 4 PATCH | Refills: 1 | Status: SHIPPED | OUTPATIENT
Start: 2023-04-26 | End: 2023-05-24

## 2023-04-26 RX ORDER — GABAPENTIN 300 MG/1
300 CAPSULE ORAL 2 TIMES DAILY
Qty: 60 CAPSULE | Refills: 1 | Status: SHIPPED | OUTPATIENT
Start: 2023-04-26 | End: 2023-05-26

## 2023-04-26 NOTE — PROGRESS NOTES
metFORMIN (GLUCOPHAGE) 500 MG tablet 1 tablet 2 times daily (with meals)      levothyroxine (SYNTHROID) 75 MCG tablet Take 1 tablet by mouth Daily      gabapentin (NEURONTIN) 300 MG capsule Take 1 capsule by mouth 2 times daily for 30 days. 60 capsule 1    ibuprofen (ADVIL;MOTRIN) 800 MG tablet Take 1 tablet by mouth 2 times daily as needed for Pain 60 tablet 0     No current facility-administered medications for this visit. I will continue her current medication regimen  which is part of the above treatment schedule. It has been helping with Ms. Payne's chronic  medical problems which for this visit include:   Diagnoses of Chronic pain syndrome, Diabetic polyneuropathy associated with type 2 diabetes mellitus ([facility] [address] Nontraumatic compression fracture of L5 vertebra, sequela, Encounter for therapeutic drug monitoring, and DDD (degenerative disc disease), lumbar were pertinent to this visit. Risks and benefits of the medications and other alternative treatments  including no treatment were discussed with the patient. The common side effects of these medications were also explained to the patient. Informed verbal consent was obtained. Goals of current treatment regimen include improvement in pain, restoration of functioning- with focus on improvement in physical performance, general activity, work or disability,emotional distress, health care utilization and  decreased medication consumption. Will continue to monitor progress towards achieving/maintaining therapeutic goals with special emphasis on  1. Improvement in perceived interfernce  of pain with ADL's. Ability to do home exercises independently. Ability to do household chores indoor and/or outdoor work and social and leisure activities. Improve psychosocial and physical functioning. - she is showing progression towards this treatment goal with the current regimen.        She was advised against drinking alcohol with the narcotic pain medicines, advised against

## 2023-05-17 DIAGNOSIS — E11.42 DIABETIC POLYNEUROPATHY ASSOCIATED WITH TYPE 2 DIABETES MELLITUS (HCC): ICD-10-CM

## 2023-05-17 DIAGNOSIS — M48.56XS NONTRAUMATIC COMPRESSION FRACTURE OF L4 VERTEBRA, SEQUELA: ICD-10-CM

## 2023-05-17 DIAGNOSIS — G89.4 CHRONIC PAIN SYNDROME: ICD-10-CM

## 2023-05-17 DIAGNOSIS — M51.36 DDD (DEGENERATIVE DISC DISEASE), LUMBAR: ICD-10-CM

## 2023-06-15 RX ORDER — BUPRENORPHINE 5 UG/H
PATCH TRANSDERMAL
Qty: 4 PATCH | Refills: 1 | OUTPATIENT
Start: 2023-06-15

## 2023-06-19 ENCOUNTER — TELEPHONE (OUTPATIENT)
Dept: PAIN MANAGEMENT | Age: 83
End: 2023-06-19

## 2023-06-19 NOTE — TELEPHONE ENCOUNTER
Patient's Daughter called and said that the patient is physically unable to go to her appointment and she needs her pain patches.  Patient would like to know if the patches can be sent to her pharmacy without being seen         Please advise

## 2023-06-21 ENCOUNTER — OFFICE VISIT (OUTPATIENT)
Dept: PAIN MANAGEMENT | Age: 83
End: 2023-06-21
Payer: MEDICARE

## 2023-06-21 VITALS
SYSTOLIC BLOOD PRESSURE: 102 MMHG | OXYGEN SATURATION: 96 % | DIASTOLIC BLOOD PRESSURE: 68 MMHG | BODY MASS INDEX: 36.36 KG/M2 | WEIGHT: 180 LBS | HEART RATE: 75 BPM

## 2023-06-21 DIAGNOSIS — M51.36 DDD (DEGENERATIVE DISC DISEASE), LUMBAR: ICD-10-CM

## 2023-06-21 DIAGNOSIS — E11.42 DIABETIC POLYNEUROPATHY ASSOCIATED WITH TYPE 2 DIABETES MELLITUS (HCC): ICD-10-CM

## 2023-06-21 DIAGNOSIS — M48.56XS NONTRAUMATIC COMPRESSION FRACTURE OF L4 VERTEBRA, SEQUELA: ICD-10-CM

## 2023-06-21 DIAGNOSIS — G89.4 CHRONIC PAIN SYNDROME: ICD-10-CM

## 2023-06-21 PROCEDURE — 99213 OFFICE O/P EST LOW 20 MIN: CPT | Performed by: INTERNAL MEDICINE

## 2023-06-21 PROCEDURE — 1123F ACP DISCUSS/DSCN MKR DOCD: CPT | Performed by: INTERNAL MEDICINE

## 2023-06-21 RX ORDER — BUPRENORPHINE 5 UG/H
1 PATCH TRANSDERMAL WEEKLY
Qty: 4 PATCH | Refills: 0 | Status: SHIPPED | OUTPATIENT
Start: 2023-06-21 | End: 2023-07-21

## (undated) DEVICE — GOWN SIRUS NONREIN LG W/TWL: Brand: MEDLINE INDUSTRIES, INC.

## (undated) DEVICE — SYRINGE MED 3ML CLR PLAS STD N CTRL LUERLOCK TIP DISP

## (undated) DEVICE — SOLUTION IV IRRIG POUR BRL 0.9% SODIUM CHL 2F7124

## (undated) DEVICE — SHOE CAST HK  LOOP CLOSURE MED 6-8 IN FEMALE PROCARE

## (undated) DEVICE — BANDAGE GZ W2XL75IN ST RAYON POLY CNFRM STRTCH LTWT

## (undated) DEVICE — NEPTUNE E-SEP SMOKE EVACUATION PENCIL, COATED, 70MM BLADE, PUSH BUTTON SWITCH: Brand: NEPTUNE E-SEP

## (undated) DEVICE — SOLUTION SCRB 4OZ 10% POVIDONE IOD ANTIMIC BTL

## (undated) DEVICE — TUBING, SUCTION, 1/4" X 12', STRAIGHT: Brand: MEDLINE

## (undated) DEVICE — BANDAGE COMPR W4INXL15FT BGE E SGL LAYERED CLP CLSR

## (undated) DEVICE — SUTURE VCRL + SZ 4-0 L27IN ABSRB WHT FS-2 3/8 CIR REV CUT VCP422H

## (undated) DEVICE — STOCKINETTE,IMPERVIOUS,12X48,STERILE: Brand: MEDLINE

## (undated) DEVICE — STRIP,CLOSURE,WOUND,MEDI-STRIP,1/2X4: Brand: MEDLINE

## (undated) DEVICE — STERILE LATEX POWDER-FREE SURGICAL GLOVESWITH NITRILE AND EMOLLIENT COATINGS: Brand: PROTEXIS

## (undated) DEVICE — BANDAGE,GAUZE,BULKEE II,4.5"X4.1YD,STRL: Brand: MEDLINE

## (undated) DEVICE — SHOE, POST-OPERATIVE: Brand: DEROYAL

## (undated) DEVICE — 3M™ COBAN™ NL STERILE NON-LATEX SELF-ADHERENT WRAP, 2084S, 4 IN X 5 YD (10 CM X 4,5 M), 18 ROLLS/CASE: Brand: 3M™ COBAN™

## (undated) DEVICE — PREMIUM DRY TRAY LF: Brand: MEDLINE INDUSTRIES, INC.

## (undated) DEVICE — ELECTRODE PT RET AD L9FT HI MOIST COND ADH HYDRGEL CORDED

## (undated) DEVICE — PODIATRY: Brand: MEDLINE INDUSTRIES, INC.

## (undated) DEVICE — SUTURE NONABSORBABLE MONOFILAMENT 4-0 PS-2 18 IN BLK ETHILON 1667G

## (undated) DEVICE — SUTURE NONABSORBABLE MONOFILAMENT 4-0 FS-2 18 IN ETHILON 662H

## (undated) DEVICE — BASIC SINGLE BASIN 1-LF: Brand: MEDLINE INDUSTRIES, INC.

## (undated) DEVICE — TOWEL,OR,DSP,ST,BLUE,STD,4/PK,20PK/CS: Brand: MEDLINE

## (undated) DEVICE — DRESSING PETRO W3XL3IN OIL EMUL N ADH GZ KNIT IMPREG CELOS

## (undated) DEVICE — PADDING UNDERCAST W4INXL4YD 100% COT CRIMPED FINISH WBRL II